# Patient Record
Sex: FEMALE | Race: WHITE | Employment: OTHER | URBAN - METROPOLITAN AREA
[De-identification: names, ages, dates, MRNs, and addresses within clinical notes are randomized per-mention and may not be internally consistent; named-entity substitution may affect disease eponyms.]

---

## 2017-08-24 ENCOUNTER — HOSPITAL ENCOUNTER (INPATIENT)
Facility: HOSPITAL | Age: 82
LOS: 2 days | Discharge: HOME HEALTH | DRG: 309 | End: 2017-08-26
Attending: EMERGENCY MEDICINE | Admitting: HOSPITALIST
Payer: MEDICARE

## 2017-08-24 ENCOUNTER — APPOINTMENT (OUTPATIENT)
Dept: GENERAL RADIOLOGY | Facility: HOSPITAL | Age: 82
DRG: 309 | End: 2017-08-24
Attending: EMERGENCY MEDICINE
Payer: MEDICARE

## 2017-08-24 ENCOUNTER — OFFICE VISIT (OUTPATIENT)
Dept: INTERNAL MEDICINE CLINIC | Facility: CLINIC | Age: 82
End: 2017-08-24

## 2017-08-24 VITALS
WEIGHT: 131 LBS | RESPIRATION RATE: 14 BRPM | BODY MASS INDEX: 21.83 KG/M2 | OXYGEN SATURATION: 98 % | HEIGHT: 65 IN | HEART RATE: 80 BPM | TEMPERATURE: 98 F | DIASTOLIC BLOOD PRESSURE: 110 MMHG | SYSTOLIC BLOOD PRESSURE: 180 MMHG

## 2017-08-24 DIAGNOSIS — I48.91 ATRIAL FIBRILLATION WITH RAPID VENTRICULAR RESPONSE (HCC): Primary | ICD-10-CM

## 2017-08-24 DIAGNOSIS — R20.2 PINS AND NEEDLES SENSATION: ICD-10-CM

## 2017-08-24 DIAGNOSIS — L98.9 SKIN ABNORMALITY: ICD-10-CM

## 2017-08-24 DIAGNOSIS — I10 ESSENTIAL HYPERTENSION: Primary | ICD-10-CM

## 2017-08-24 DIAGNOSIS — I48.91 ATRIAL FIBRILLATION WITH RAPID VENTRICULAR RESPONSE (HCC): ICD-10-CM

## 2017-08-24 DIAGNOSIS — T14.8XXA WOUND OF SKIN: ICD-10-CM

## 2017-08-24 LAB
ANION GAP SERPL CALC-SCNC: 9 MMOL/L (ref 0–18)
APTT PPP: 29.4 SECONDS (ref 23.2–35.3)
BASOPHILS # BLD: 0.1 K/UL (ref 0–0.2)
BASOPHILS NFR BLD: 1 %
BUN SERPL-MCNC: 16 MG/DL (ref 8–20)
BUN/CREAT SERPL: 18.6 (ref 10–20)
CALCIUM SERPL-MCNC: 9.3 MG/DL (ref 8.5–10.5)
CHLORIDE SERPL-SCNC: 99 MMOL/L (ref 95–110)
CO2 SERPL-SCNC: 27 MMOL/L (ref 22–32)
CREAT SERPL-MCNC: 0.86 MG/DL (ref 0.5–1.5)
EOSINOPHIL # BLD: 0.1 K/UL (ref 0–0.7)
EOSINOPHIL NFR BLD: 1 %
ERYTHROCYTE [DISTWIDTH] IN BLOOD BY AUTOMATED COUNT: 14.2 % (ref 11–15)
GLUCOSE SERPL-MCNC: 128 MG/DL (ref 70–99)
HCT VFR BLD AUTO: 40.8 % (ref 35–48)
HGB BLD-MCNC: 13.3 G/DL (ref 12–16)
INR BLD: 1 (ref 0.9–1.2)
LYMPHOCYTES # BLD: 1 K/UL (ref 1–4)
LYMPHOCYTES NFR BLD: 14 %
MCH RBC QN AUTO: 30.4 PG (ref 27–32)
MCHC RBC AUTO-ENTMCNC: 32.7 G/DL (ref 32–37)
MCV RBC AUTO: 92.9 FL (ref 80–100)
MONOCYTES # BLD: 0.6 K/UL (ref 0–1)
MONOCYTES NFR BLD: 8 %
NEUTROPHILS # BLD AUTO: 5.7 K/UL (ref 1.8–7.7)
NEUTROPHILS NFR BLD: 76 %
OSMOLALITY UR CALC.SUM OF ELEC: 283 MOSM/KG (ref 275–295)
PLATELET # BLD AUTO: 234 K/UL (ref 140–400)
PMV BLD AUTO: 8.3 FL (ref 7.4–10.3)
POTASSIUM SERPL-SCNC: 4.8 MMOL/L (ref 3.3–5.1)
PROTHROMBIN TIME: 12.9 SECONDS (ref 11.8–14.5)
RBC # BLD AUTO: 4.39 M/UL (ref 3.7–5.4)
SODIUM SERPL-SCNC: 135 MMOL/L (ref 136–144)
TROPONIN I SERPL-MCNC: 0.01 NG/ML (ref ?–0.03)
WBC # BLD AUTO: 7.5 K/UL (ref 4–11)

## 2017-08-24 PROCEDURE — G0463 HOSPITAL OUTPT CLINIC VISIT: HCPCS | Performed by: INTERNAL MEDICINE

## 2017-08-24 PROCEDURE — 93010 ELECTROCARDIOGRAM REPORT: CPT | Performed by: INTERNAL MEDICINE

## 2017-08-24 PROCEDURE — 99215 OFFICE O/P EST HI 40 MIN: CPT | Performed by: INTERNAL MEDICINE

## 2017-08-24 PROCEDURE — 71010 XR CHEST AP PORTABLE  (CPT=71010): CPT | Performed by: EMERGENCY MEDICINE

## 2017-08-24 PROCEDURE — 99222 1ST HOSP IP/OBS MODERATE 55: CPT | Performed by: HOSPITALIST

## 2017-08-24 PROCEDURE — 93005 ELECTROCARDIOGRAM TRACING: CPT | Performed by: INTERNAL MEDICINE

## 2017-08-24 RX ORDER — ONDANSETRON 2 MG/ML
4 INJECTION INTRAMUSCULAR; INTRAVENOUS EVERY 6 HOURS PRN
Status: DISCONTINUED | OUTPATIENT
Start: 2017-08-24 | End: 2017-08-26

## 2017-08-24 RX ORDER — ACETAMINOPHEN 325 MG/1
650 TABLET ORAL EVERY 6 HOURS PRN
Status: DISCONTINUED | OUTPATIENT
Start: 2017-08-24 | End: 2017-08-26

## 2017-08-24 RX ORDER — HEPARIN SODIUM 5000 [USP'U]/ML
5000 INJECTION, SOLUTION INTRAVENOUS; SUBCUTANEOUS EVERY 12 HOURS SCHEDULED
Status: DISCONTINUED | OUTPATIENT
Start: 2017-08-24 | End: 2017-08-26

## 2017-08-24 RX ORDER — SODIUM CHLORIDE 0.9 % (FLUSH) 0.9 %
3 SYRINGE (ML) INJECTION AS NEEDED
Status: DISCONTINUED | OUTPATIENT
Start: 2017-08-24 | End: 2017-08-26

## 2017-08-24 RX ORDER — DILTIAZEM HYDROCHLORIDE 5 MG/ML
10 INJECTION INTRAVENOUS
Status: DISPENSED | OUTPATIENT
Start: 2017-08-24 | End: 2017-08-24

## 2017-08-24 RX ORDER — NITROGLYCERIN 0.4 MG/1
0.4 TABLET SUBLINGUAL EVERY 5 MIN PRN
Status: DISCONTINUED | OUTPATIENT
Start: 2017-08-24 | End: 2017-08-26

## 2017-08-24 RX ORDER — DILTIAZEM HYDROCHLORIDE 60 MG/1
60 TABLET, FILM COATED ORAL AS NEEDED
Status: DISCONTINUED | OUTPATIENT
Start: 2017-08-24 | End: 2017-08-26

## 2017-08-24 NOTE — PROGRESS NOTES
HPI:   Romayne Ponder is a 80year old female presents with the following problems. Patient presents after having not been seen since October 27, 2014.   At that time I was treating her for hypertension, hyperlipidemia, osteoporosis and history of skin can 04/03/2012    (as per NG)   • Osteoarthrosis, unspecified whether generalized or localized, unspecified site     (as per NG)   • Osteoporosis screening 02/23/2009    Dexascan (as per NG)   • Other and unspecified hyperlipidemia     (as per NG)   • Unspecif mOsm/kg   AST 28 15 - 41 U/L   ALT 14 14 - 54 U/L   Alkaline Phosphatase 89 32 - 100 U/L   Bilirubin, Total 1.1 0.3 - 1.2 mg/dL   TOTAL PROTEIN (P) 7.5 5.9 - 8.4 g/dL   ALBUMIN (P) 3.9 3.5 - 4.8 g/dL   GLOBULIN 3.6 2.5 - 3.7 g/dL   A/G RATIO 1.1 1.0 - 2.0 98.3 °F (36.8 °C) (Oral)   Resp 14   Ht 5' 5\" (1.651 m)   Wt 131 lb (59.4 kg)   SpO2 98%   BMI 21.80 kg/m²        GENERAL:  well developed, well nourished. in no apparent distress  SKIN: 0.5 cm skin lesion right cheek suspicious for skin cancer.   See abo fibrillation. She is increased stroke risk. Decision on antihypertensive therapy. Wound care. Patient agreeable. She is here with her daughter Alexey Amin. She is stable enough for transfer asked to go to the emergency room.         This was a high complexi

## 2017-08-25 ENCOUNTER — APPOINTMENT (OUTPATIENT)
Dept: CV DIAGNOSTICS | Facility: HOSPITAL | Age: 82
DRG: 309 | End: 2017-08-25
Attending: HOSPITALIST
Payer: MEDICARE

## 2017-08-25 LAB
ANION GAP SERPL CALC-SCNC: 5 MMOL/L (ref 0–18)
BASOPHILS # BLD: 0.1 K/UL (ref 0–0.2)
BASOPHILS NFR BLD: 1 %
BUN SERPL-MCNC: 15 MG/DL (ref 8–20)
BUN/CREAT SERPL: 20 (ref 10–20)
CALCIUM SERPL-MCNC: 8.5 MG/DL (ref 8.5–10.5)
CHLORIDE SERPL-SCNC: 104 MMOL/L (ref 95–110)
CO2 SERPL-SCNC: 30 MMOL/L (ref 22–32)
CREAT SERPL-MCNC: 0.75 MG/DL (ref 0.5–1.5)
EOSINOPHIL # BLD: 0.2 K/UL (ref 0–0.7)
EOSINOPHIL NFR BLD: 4 %
ERYTHROCYTE [DISTWIDTH] IN BLOOD BY AUTOMATED COUNT: 14.5 % (ref 11–15)
GLUCOSE SERPL-MCNC: 83 MG/DL (ref 70–99)
HCT VFR BLD AUTO: 35.3 % (ref 35–48)
HGB BLD-MCNC: 11.7 G/DL (ref 12–16)
LYMPHOCYTES # BLD: 0.8 K/UL (ref 1–4)
LYMPHOCYTES NFR BLD: 15 %
MCH RBC QN AUTO: 30.7 PG (ref 27–32)
MCHC RBC AUTO-ENTMCNC: 33.2 G/DL (ref 32–37)
MCV RBC AUTO: 92.6 FL (ref 80–100)
MONOCYTES # BLD: 0.5 K/UL (ref 0–1)
MONOCYTES NFR BLD: 9 %
NEUTROPHILS # BLD AUTO: 3.8 K/UL (ref 1.8–7.7)
NEUTROPHILS NFR BLD: 71 %
OSMOLALITY UR CALC.SUM OF ELEC: 288 MOSM/KG (ref 275–295)
PLATELET # BLD AUTO: 204 K/UL (ref 140–400)
PMV BLD AUTO: 7.8 FL (ref 7.4–10.3)
POTASSIUM SERPL-SCNC: 4.3 MMOL/L (ref 3.3–5.1)
RBC # BLD AUTO: 3.81 M/UL (ref 3.7–5.4)
SODIUM SERPL-SCNC: 139 MMOL/L (ref 136–144)
WBC # BLD AUTO: 5.3 K/UL (ref 4–11)

## 2017-08-25 PROCEDURE — 99232 SBSQ HOSP IP/OBS MODERATE 35: CPT | Performed by: HOSPITALIST

## 2017-08-25 PROCEDURE — 93306 TTE W/DOPPLER COMPLETE: CPT | Performed by: HOSPITALIST

## 2017-08-25 NOTE — PROGRESS NOTES
[facility] HOSP - [facility]    Progress Note    Tushar Sanchez Patient Status:  Inpatient    1922 MRN [id number]   Location [facility] 3W/SW Attending Grace Ardon MD   Hosp Day # 1 PCP Yenny Mayfield MD       Subjective:   Tushar Sanchez i DilTIAZem HCl (CARDIZEM) tab 60 mg, 60 mg, Oral, PRN  •  metoprolol Tartrate (LOPRESSOR) tab 25 mg, 25 mg, Oral, BID    Assessment and Plan:     Atrial fibrillation with rapid ventricular response (HCC)  Rate now controlled, rhythm afib, bp stable  Cont BB

## 2017-08-25 NOTE — ED NOTES
Patient is safe to transport to floor per MD. Report given to floor CV  RN, Avinash Bucio. Transport via cart will be requested upon arrival of cardiac telemetry box.

## 2017-08-25 NOTE — ED PROVIDER NOTES
Patient Seen in: [facility] 3w/sw    History   Patient presents with:  Abnormal Result (metabolic, cardiac)    Stated Complaint: abnormal ekg    HPI    Patient complains of elevated hr unsure of onset. Yonas Beverly went to routine office visit and noted to h Alcohol use: Yes              Comment: Occasionally, Wine, 1 glass (as per NG)      Review of Systems    Positive for stated complaint: abnormal ekg  Other systems are as noted in HPI. Constitutional and vital signs reviewed.       All other -----------         ------                     CBC W/ DIFFERENTIAL[[id number]]          Normal              Final result                 Please view results for these tests on the individual orders.    BASIC METABOLIC PANEL (8)   CBC WITH DIFFER

## 2017-08-25 NOTE — PROGRESS NOTES
[facility]    Progress Note    Iliana Zaman Patient Status:  Inpatient    1922 MRN [id number]   Location [facility] 3W/SW Attending Mason Tam MD   Hosp Day # 1 PCP Priyanka Acevedo MD         Assessment and Plan:     A ALB 3.9 10/27/2014   BILT 1.1 10/27/2014   TP 7.5 10/27/2014   AST 28 10/27/2014   ALT 14 10/27/2014   PTT 29.4 08/24/2017   INR 1.0 08/24/2017   T4F 0.87 12/17/2013   TSH 4.44 12/17/2013   TROP 0.01 08/24/2017       Xr Chest Ap Portable  (cpt=71010)

## 2017-08-25 NOTE — WOUND PROGRESS NOTE
WOUND CARE NOTE      PLAN   Recommendations:  Heels elevated using pillows, heel wedge or heel boots to offload heels  Glucose control to help promote wound healing  Moisturize skin daily and prn    Wound(s)  Location: R posterior malleolus  Cleansing  W

## 2017-08-25 NOTE — CM/SW NOTE
Explanation of of BPCI/Medicare program provided. Patient was enrolled under . Patient/family agreed to phone f/u for 3 months from [address] [facility] after discharge from [address] [place] [address]. BPCI/Medicare letter and brochure provided.     From home with daughter

## 2017-08-25 NOTE — H&P
[facility]    PATIENT'S NAME: Dragan Chowdhury PHYSICIAN: Gabriela Hicks MD   PATIENT ACCOUNT#:   [id number]    LOCATION:  [place] [address]  MEDICAL RECORD #:   [id number]       YOB: 1922  ADMISSION DATE:       08/24/2017 PHYSICAL EXAMINATION:    GENERAL:  Alert and oriented to time, place and person, no acute distress. VITAL SIGNS:  Temperature 98.8, pulse 78, respiratory rate 18, blood pressure 145/55, pulse ox 98% on room air. HEENT:  Atraumatic. Oropharynx clear.

## 2017-08-25 NOTE — CONSULTS
[facility]  Report of Consultation    Godfreymarnaun Peñaysabel Patient Status:  Inpatient    1922 MRN [id number]   Location [facility] 3W/SW Attending Misa Hinkle MD   Hosp Day # 0 PCP Kamla Pepper MD     Reason for Consultation:  Afib drugs.     Allergies:  No Known Allergies    Medications:    Current Facility-Administered Medications:   •  DilTIAZem HCl (CARDIZEM) injection 10 mg, 10 mg, Intravenous, Q1H PRN  •  Normal Saline Flush 0.9 % injection 3 mL, 3 mL, Intravenous, PRN  •  Atrop beta blockers   -ECHO   -TSH   -discussion regarding anticoagulation with a CHADs score of 2  2. Anticoagulation - though 89 she looks much younger; discuss with primary MD regarding ?anticougulation  3.  HTN - not treated due to non compliance; will likely

## 2017-08-26 VITALS
SYSTOLIC BLOOD PRESSURE: 124 MMHG | HEART RATE: 86 BPM | WEIGHT: 127.31 LBS | DIASTOLIC BLOOD PRESSURE: 81 MMHG | TEMPERATURE: 98 F | BODY MASS INDEX: 21.21 KG/M2 | HEIGHT: 65 IN | OXYGEN SATURATION: 95 % | RESPIRATION RATE: 16 BRPM

## 2017-08-26 LAB
ANION GAP SERPL CALC-SCNC: 5 MMOL/L (ref 0–18)
BASOPHILS # BLD: 0.1 K/UL (ref 0–0.2)
BASOPHILS NFR BLD: 1 %
BUN SERPL-MCNC: 18 MG/DL (ref 8–20)
BUN/CREAT SERPL: 21.2 (ref 10–20)
CALCIUM SERPL-MCNC: 8.6 MG/DL (ref 8.5–10.5)
CHLORIDE SERPL-SCNC: 103 MMOL/L (ref 95–110)
CO2 SERPL-SCNC: 29 MMOL/L (ref 22–32)
CREAT SERPL-MCNC: 0.85 MG/DL (ref 0.5–1.5)
EOSINOPHIL # BLD: 0.3 K/UL (ref 0–0.7)
EOSINOPHIL NFR BLD: 5 %
ERYTHROCYTE [DISTWIDTH] IN BLOOD BY AUTOMATED COUNT: 14.6 % (ref 11–15)
GLUCOSE SERPL-MCNC: 84 MG/DL (ref 70–99)
HCT VFR BLD AUTO: 36.6 % (ref 35–48)
HGB BLD-MCNC: 12.1 G/DL (ref 12–16)
LYMPHOCYTES # BLD: 1.1 K/UL (ref 1–4)
LYMPHOCYTES NFR BLD: 20 %
MCH RBC QN AUTO: 30.7 PG (ref 27–32)
MCHC RBC AUTO-ENTMCNC: 33.1 G/DL (ref 32–37)
MCV RBC AUTO: 92.5 FL (ref 80–100)
MONOCYTES # BLD: 0.5 K/UL (ref 0–1)
MONOCYTES NFR BLD: 9 %
NEUTROPHILS # BLD AUTO: 3.7 K/UL (ref 1.8–7.7)
NEUTROPHILS NFR BLD: 65 %
OSMOLALITY UR CALC.SUM OF ELEC: 285 MOSM/KG (ref 275–295)
PLATELET # BLD AUTO: 208 K/UL (ref 140–400)
PMV BLD AUTO: 8.3 FL (ref 7.4–10.3)
POTASSIUM SERPL-SCNC: 4.2 MMOL/L (ref 3.3–5.1)
RBC # BLD AUTO: 3.95 M/UL (ref 3.7–5.4)
SODIUM SERPL-SCNC: 137 MMOL/L (ref 136–144)
WBC # BLD AUTO: 5.7 K/UL (ref 4–11)

## 2017-08-26 PROCEDURE — 99239 HOSP IP/OBS DSCHRG MGMT >30: CPT | Performed by: HOSPITALIST

## 2017-08-26 NOTE — PLAN OF CARE
Home health to follow , rx faxed to pharmacy, instructions given, no further question, bleeding precautions reviewed

## 2017-08-26 NOTE — PROGRESS NOTES
[facility]    Cardiology Progress Note  [facility]    Rachel Calderon Patient Status:  Inpatient    1922 MRN [id number]   Location [facility] 3W/SW Attending Mac Ness MD   Hosp Day # 2 PCP Ariana Garcia density at the right lower chest which represents a sclerotic bone lesion at anterior aspect of the right sixth rib on previous CT imaging. Can't exclude enlarging bone lesion, and a followup CT the chest is advised to further evaluate.  No acute infiltrate

## 2017-08-26 NOTE — DISCHARGE SUMMARY
[facility]    Discharge Summary    Quyen Huertas Patient Status:  Inpatient    1922 MRN [id number]   Location [facility] 3W/SW Attending Naren Bauman MD   Hosp Day # 2 PCP Tra Quinteros MD     Date of Admission:  was no edema, R foot dressing CDI  NEUROLOGIC: no focal defecits    History of Present Illness:   Per Dr. Jimmy Berman  The patient is a 68-year-old  female who went to see her primary care physician, Dr. Travon Winston, today because of an abrasion and wound Take 1 tablet by mouth daily. take 2 by Oral route  every day   Refills:  0     Vitamin D3 2000 units Caps  Commonly known as:  VITAMIN D3      Take  by mouth.  take 1 by po route  every day   Refills:  0           Where to Get Your Medications      Sadea

## 2017-08-26 NOTE — HOME CARE LIAISON
DIAGNOSES AND PERTINENT MEDICAL HISTORY: AFIB, WOUND TO R POSTERIOR MALLEOLUS    FACILITY NAME AND DC DATE: [address] [place] [address] 8/26    BEDSIDE VISIT WITH: PTNT AND DAUGHTER    SERVICES ORDERED: RN     VERIFIED PATIENT ADDRESS, PHONE NUMBER AND CAREGIVER: YES    PCP IS

## 2017-08-28 ENCOUNTER — TELEPHONE (OUTPATIENT)
Dept: INTERNAL MEDICINE CLINIC | Facility: CLINIC | Age: 82
End: 2017-08-28

## 2017-08-28 ENCOUNTER — PATIENT OUTREACH (OUTPATIENT)
Dept: INTERNAL MEDICINE CLINIC | Facility: CLINIC | Age: 82
End: 2017-08-28

## 2017-08-28 RX ORDER — ACETAMINOPHEN 325 MG/1
325 TABLET ORAL EVERY 6 HOURS PRN
COMMUNITY
End: 2020-01-15 | Stop reason: ALTCHOICE

## 2017-08-28 NOTE — TELEPHONE ENCOUNTER
[phone]  Admitted pt to [facility] today and is it ok to ck pt O2 at each visit  Also add PT to evaluate patient  To clinical

## 2017-08-28 NOTE — PROGRESS NOTES
Initial Post Discharge Follow Up   Discharge Date: 8/26/17  Contact Date: 8/28/2017    Consent Verification:  Assessment Completed With: Patient  HIPAA Verified? Yes    1.  Tell me why you were in the hospital?  Patient was seen by PCP in the office for a appointments     Date & Time Appointment Department [facility])    Sep 05, 2017  8:00 AM CDT [address] [facility] Jerardo with [facility]  [facility] [address] ([address])    Sep 05, 2017 10:30 AM CDT [facility]

## 2017-08-28 NOTE — TELEPHONE ENCOUNTER
Fernandez Acevedo from [facility] and relayed DR. ELA lock and she verbalized understanding

## 2017-08-28 NOTE — TELEPHONE ENCOUNTER
Yes.  Absolutely. Check O2 saturation each visit along with blood pressure and pulse. Also physical therapy and maybe occupational therapy to evaluate. In addition may be social service to evaluate.

## 2017-08-29 ENCOUNTER — TELEPHONE (OUTPATIENT)
Dept: CARDIOLOGY UNIT | Facility: HOSPITAL | Age: 82
End: 2017-08-29

## 2017-08-30 ENCOUNTER — TELEPHONE (OUTPATIENT)
Dept: INTERNAL MEDICINE CLINIC | Facility: CLINIC | Age: 82
End: 2017-08-30

## 2017-08-30 NOTE — TELEPHONE ENCOUNTER
Please advise - called [address] [place] [address] who wants to know if they can change from wound cleanser to normal saline ( cleanser is bothering patient ) to DR. MAHMOOD

## 2017-08-30 NOTE — TELEPHONE ENCOUNTER
Called  from [facility] and relayed DR. MAHMOOD message and she verbalized understanding

## 2017-08-30 NOTE — TELEPHONE ENCOUNTER
Please call Ninoska Faye @ [facility] re: patient's wound care orders [phone]

## 2017-09-01 ENCOUNTER — TELEPHONE (OUTPATIENT)
Dept: INTERNAL MEDICINE CLINIC | Facility: CLINIC | Age: 82
End: 2017-09-01

## 2017-09-04 NOTE — PROGRESS NOTES
[facility] Patient Status:  Outpatient    1922 MRN [id number]   Location MD Hamlet Santos MD       Cherri Gaona is a 80year old female who presents to clinic for hosp 09/05/2017 07:58 AM    (H) 09/05/2017 07:58 AM   CA 9.1 09/05/2017 07:58 AM   ALB 3.4 (L) 09/05/2017 07:58 AM   ALKPHO 98 09/05/2017 07:58 AM   BILT 0.6 09/05/2017 07:58 AM   TP 6.6 09/05/2017 07:58 AM   AST 51 (H) 09/05/2017 07:58 AM   ALT 22 09/05 and untreated HTN. Was also treated for right heal wound. Patient had stopped taking her medications for the last couple years. Rate controlled with metoprolol tartrate and anticoagulated on Eliquis 2.5 mg twice daily, [id number] score of 3.    History of blood pressure in with you to next clinic visit.     Omron blood pressure monitor is a quality monitor        Additional recommendations:     · Compare your home medications with the medication list attached, call with questions or there are any differences

## 2017-09-05 ENCOUNTER — TELEPHONE (OUTPATIENT)
Dept: INTERNAL MEDICINE CLINIC | Facility: CLINIC | Age: 82
End: 2017-09-05

## 2017-09-05 ENCOUNTER — OFFICE VISIT (OUTPATIENT)
Dept: CARDIOLOGY CLINIC | Facility: HOSPITAL | Age: 82
End: 2017-09-05
Attending: INTERNAL MEDICINE
Payer: MEDICARE

## 2017-09-05 ENCOUNTER — OFFICE VISIT (OUTPATIENT)
Dept: INTERNAL MEDICINE CLINIC | Facility: CLINIC | Age: 82
End: 2017-09-05

## 2017-09-05 VITALS
BODY MASS INDEX: 22 KG/M2 | DIASTOLIC BLOOD PRESSURE: 83 MMHG | OXYGEN SATURATION: 97 % | SYSTOLIC BLOOD PRESSURE: 165 MMHG | WEIGHT: 133 LBS | HEART RATE: 92 BPM

## 2017-09-05 VITALS
SYSTOLIC BLOOD PRESSURE: 164 MMHG | OXYGEN SATURATION: 97 % | HEIGHT: 65 IN | DIASTOLIC BLOOD PRESSURE: 100 MMHG | WEIGHT: 133.38 LBS | HEART RATE: 92 BPM | BODY MASS INDEX: 22.22 KG/M2 | TEMPERATURE: 98 F

## 2017-09-05 DIAGNOSIS — I10 ESSENTIAL HYPERTENSION: Primary | ICD-10-CM

## 2017-09-05 DIAGNOSIS — I48.19 PERSISTENT ATRIAL FIBRILLATION (HCC): ICD-10-CM

## 2017-09-05 DIAGNOSIS — L98.9 SKIN LESION OF CHEEK: ICD-10-CM

## 2017-09-05 DIAGNOSIS — I49.5 SICK SINUS SYNDROME (HCC): ICD-10-CM

## 2017-09-05 DIAGNOSIS — I48.91 ATRIAL FIBRILLATION WITH RAPID VENTRICULAR RESPONSE (HCC): ICD-10-CM

## 2017-09-05 DIAGNOSIS — I10 ESSENTIAL HYPERTENSION: ICD-10-CM

## 2017-09-05 DIAGNOSIS — I48.91 ATRIAL FIBRILLATION, UNSPECIFIED TYPE (HCC): ICD-10-CM

## 2017-09-05 DIAGNOSIS — E78.5 HYPERLIPIDEMIA, UNSPECIFIED HYPERLIPIDEMIA TYPE: ICD-10-CM

## 2017-09-05 DIAGNOSIS — I48.91 ATRIAL FIBRILLATION (HCC): Primary | ICD-10-CM

## 2017-09-05 DIAGNOSIS — R20.2 PINS AND NEEDLES SENSATION: ICD-10-CM

## 2017-09-05 DIAGNOSIS — S81.801D WOUND OF RIGHT LOWER EXTREMITY, SUBSEQUENT ENCOUNTER: ICD-10-CM

## 2017-09-05 PROBLEM — R93.89 ABNORMAL CXR: Status: ACTIVE | Noted: 2017-09-05

## 2017-09-05 LAB
ALBUMIN SERPL BCP-MCNC: 3.4 G/DL (ref 3.5–4.8)
ALBUMIN/GLOB SERPL: 1.1 {RATIO} (ref 1–2)
ALP SERPL-CCNC: 98 U/L (ref 32–100)
ALT SERPL-CCNC: 22 U/L (ref 14–54)
ANION GAP SERPL CALC-SCNC: 8 MMOL/L (ref 0–18)
AST SERPL-CCNC: 51 U/L (ref 15–41)
BILIRUB SERPL-MCNC: 0.6 MG/DL (ref 0.3–1.2)
BUN SERPL-MCNC: 13 MG/DL (ref 8–20)
BUN/CREAT SERPL: 15.1 (ref 10–20)
CALCIUM SERPL-MCNC: 9.1 MG/DL (ref 8.5–10.5)
CHLORIDE SERPL-SCNC: 102 MMOL/L (ref 95–110)
CO2 SERPL-SCNC: 28 MMOL/L (ref 22–32)
CREAT SERPL-MCNC: 0.86 MG/DL (ref 0.5–1.5)
GLOBULIN PLAS-MCNC: 3.2 G/DL (ref 2.5–3.7)
GLUCOSE SERPL-MCNC: 129 MG/DL (ref 70–99)
MAGNESIUM SERPL-MCNC: 2.1 MG/DL (ref 1.8–2.5)
OSMOLALITY UR CALC.SUM OF ELEC: 288 MOSM/KG (ref 275–295)
POTASSIUM SERPL-SCNC: 4.8 MMOL/L (ref 3.3–5.1)
PROT SERPL-MCNC: 6.6 G/DL (ref 5.9–8.4)
SODIUM SERPL-SCNC: 138 MMOL/L (ref 136–144)
TSH SERPL-ACNC: 6.74 UIU/ML (ref 0.45–5.33)

## 2017-09-05 PROCEDURE — 99214 OFFICE O/P EST MOD 30 MIN: CPT | Performed by: INTERNAL MEDICINE

## 2017-09-05 PROCEDURE — 36415 COLL VENOUS BLD VENIPUNCTURE: CPT | Performed by: NURSE PRACTITIONER

## 2017-09-05 PROCEDURE — 84443 ASSAY THYROID STIM HORMONE: CPT | Performed by: NURSE PRACTITIONER

## 2017-09-05 PROCEDURE — 93010 ELECTROCARDIOGRAM REPORT: CPT | Performed by: CLINICAL NURSE SPECIALIST

## 2017-09-05 PROCEDURE — 93005 ELECTROCARDIOGRAM TRACING: CPT

## 2017-09-05 PROCEDURE — 99212 OFFICE O/P EST SF 10 MIN: CPT | Performed by: NURSE PRACTITIONER

## 2017-09-05 PROCEDURE — 99214 OFFICE O/P EST MOD 30 MIN: CPT | Performed by: NURSE PRACTITIONER

## 2017-09-05 PROCEDURE — 83735 ASSAY OF MAGNESIUM: CPT | Performed by: NURSE PRACTITIONER

## 2017-09-05 PROCEDURE — G0463 HOSPITAL OUTPT CLINIC VISIT: HCPCS | Performed by: INTERNAL MEDICINE

## 2017-09-05 PROCEDURE — 80053 COMPREHEN METABOLIC PANEL: CPT | Performed by: NURSE PRACTITIONER

## 2017-09-05 RX ORDER — LISINOPRIL 5 MG/1
5 TABLET ORAL DAILY
Qty: 30 TABLET | Refills: 1 | Status: SHIPPED | OUTPATIENT
Start: 2017-09-05 | End: 2017-09-12

## 2017-09-05 NOTE — PATIENT INSTRUCTIONS
Begin taking lisinopril 5 mg tabs, one tablet daily if ok with Dr. Gio Coffman all your other same medications    Call if having any dizziness, lightheadedness, heart racing, palpitations, chest pain, shortness of breath, coughing, swelling or  weig

## 2017-09-05 NOTE — PROGRESS NOTES
HPI:   Ashley Sheikh is a 80year old female presents with the following problems. Patient was discharged from the hospital August 26. I would reach to her August 28. Today September 5.     Patient was in the hospital with atrial fibrillation with rapid mass index is 22.2 kg/m². Current Outpatient Prescriptions:  Calcium Carbonate-Vit D-Min (CALCIUM 600 + MINERALS OR) Take 1 tablet by mouth daily. Disp:  Rfl:    lisinopril 5 MG Oral Tab Take 1 tablet (5 mg total) by mouth daily.  Disp: 30 tablet Rfl: 144 mmol/L   Potassium 4.8 3.3 - 5.1 mmol/L   Chloride 102 95 - 110 mmol/L   CO2 28 22 - 32 mmol/L   BUN 13 8 - 20 mg/dL   Creatinine 0.86 0.50 - 1.50 mg/dL   Calcium, Total 9.1 8.5 - 10.5 mg/dL   ALT 22 14 - 54 U/L   AST 51 (H) 15 - 41 U/L   Alkaline Phos pharynx without exudate or erythema  EYES:  PERRL . conjunctiva clear. NECK:  supple, no adenopathy,   LUNGS:  clear to auscultation. Effort normal  CARDIO: Irregularly irregular rate and rhythm system with defibrillation without murmur.    S1 and S2 pamela and needles sensation  She has pins and needles in her hands bilaterally. Also some forearm. No neck pain. I think this is carpal tunnel syndrome. It goes away later in the day. For now we will monitor.     4. Skin lesion of cheek  Referred to dermatol

## 2017-09-06 ENCOUNTER — TELEPHONE (OUTPATIENT)
Dept: INTERNAL MEDICINE CLINIC | Facility: CLINIC | Age: 82
End: 2017-09-06

## 2017-09-06 NOTE — TELEPHONE ENCOUNTER
Edson Martin from [facility] and relayed DR. MAHMOOD message - verbalized understanding

## 2017-09-06 NOTE — TELEPHONE ENCOUNTER
Would like to stop Vaseline guaze to her right posterior lower leg  And use alginate & cover with foam     Please call Michelle Escalante with Dr Sandra Farias ok - Sindi Duty Dr Chaim Castleman is out of the office today

## 2017-09-07 ENCOUNTER — TELEPHONE (OUTPATIENT)
Dept: INTERNAL MEDICINE CLINIC | Facility: CLINIC | Age: 82
End: 2017-09-07

## 2017-09-07 NOTE — TELEPHONE ENCOUNTER
Mariama Greene needs a verbal order to R/S physical therapy from 8/31 to today.      Tasked high to Nursing

## 2017-09-11 ENCOUNTER — TELEPHONE (OUTPATIENT)
Dept: INTERNAL MEDICINE CLINIC | Facility: CLINIC | Age: 82
End: 2017-09-11

## 2017-09-12 ENCOUNTER — OFFICE VISIT (OUTPATIENT)
Dept: INTERNAL MEDICINE CLINIC | Facility: CLINIC | Age: 82
End: 2017-09-12

## 2017-09-12 ENCOUNTER — APPOINTMENT (OUTPATIENT)
Dept: LAB | Age: 82
End: 2017-09-12
Attending: INTERNAL MEDICINE
Payer: MEDICARE

## 2017-09-12 ENCOUNTER — TELEPHONE (OUTPATIENT)
Dept: INTERNAL MEDICINE CLINIC | Facility: CLINIC | Age: 82
End: 2017-09-12

## 2017-09-12 VITALS
WEIGHT: 132.19 LBS | DIASTOLIC BLOOD PRESSURE: 96 MMHG | BODY MASS INDEX: 22.02 KG/M2 | SYSTOLIC BLOOD PRESSURE: 160 MMHG | OXYGEN SATURATION: 97 % | HEIGHT: 65 IN | HEART RATE: 111 BPM | TEMPERATURE: 98 F

## 2017-09-12 DIAGNOSIS — I10 ESSENTIAL HYPERTENSION: ICD-10-CM

## 2017-09-12 DIAGNOSIS — R93.89 ABNORMAL CXR: ICD-10-CM

## 2017-09-12 DIAGNOSIS — I48.91 ATRIAL FIBRILLATION, UNSPECIFIED TYPE (HCC): ICD-10-CM

## 2017-09-12 DIAGNOSIS — R91.8 ABNORMALITY OF LUNG ON CXR: Primary | ICD-10-CM

## 2017-09-12 DIAGNOSIS — L98.9 SKIN LESION OF CHEEK: ICD-10-CM

## 2017-09-12 DIAGNOSIS — S81.801D WOUND OF RIGHT LOWER EXTREMITY, SUBSEQUENT ENCOUNTER: ICD-10-CM

## 2017-09-12 DIAGNOSIS — I10 ESSENTIAL HYPERTENSION: Primary | ICD-10-CM

## 2017-09-12 LAB
ANION GAP SERPL CALC-SCNC: 9 MMOL/L (ref 0–18)
BUN SERPL-MCNC: 14 MG/DL (ref 8–20)
BUN/CREAT SERPL: 15.6 (ref 10–20)
CALCIUM SERPL-MCNC: 9.4 MG/DL (ref 8.5–10.5)
CHLORIDE SERPL-SCNC: 100 MMOL/L (ref 95–110)
CO2 SERPL-SCNC: 29 MMOL/L (ref 22–32)
CREAT SERPL-MCNC: 0.9 MG/DL (ref 0.5–1.5)
GLUCOSE SERPL-MCNC: 92 MG/DL (ref 70–99)
OSMOLALITY UR CALC.SUM OF ELEC: 286 MOSM/KG (ref 275–295)
POTASSIUM SERPL-SCNC: 5.1 MMOL/L (ref 3.3–5.1)
SODIUM SERPL-SCNC: 138 MMOL/L (ref 136–144)
TSH SERPL-ACNC: 5.03 UIU/ML (ref 0.45–5.33)

## 2017-09-12 PROCEDURE — 36415 COLL VENOUS BLD VENIPUNCTURE: CPT

## 2017-09-12 PROCEDURE — G0463 HOSPITAL OUTPT CLINIC VISIT: HCPCS | Performed by: INTERNAL MEDICINE

## 2017-09-12 PROCEDURE — 84443 ASSAY THYROID STIM HORMONE: CPT

## 2017-09-12 PROCEDURE — 99214 OFFICE O/P EST MOD 30 MIN: CPT | Performed by: INTERNAL MEDICINE

## 2017-09-12 PROCEDURE — 80048 BASIC METABOLIC PNL TOTAL CA: CPT

## 2017-09-12 RX ORDER — LISINOPRIL 5 MG/1
TABLET ORAL
Qty: 60 TABLET | Refills: 11 | Status: SHIPPED | OUTPATIENT
Start: 2017-09-12 | End: 2017-10-26

## 2017-09-12 RX ORDER — LISINOPRIL 5 MG/1
TABLET ORAL
Qty: 30 TABLET | Refills: 1 | COMMUNITY
Start: 2017-09-12 | End: 2017-09-12

## 2017-09-12 NOTE — TELEPHONE ENCOUNTER
Patient called to let you know she has 23 lisinopril tablets left  (also states she has 12 eliquis & 26 metoprolol tablets)

## 2017-09-12 NOTE — TELEPHONE ENCOUNTER
In preparation of office visit will generate order for CT scan to follow-up on sclerotic bone lesion right sixth rib.

## 2017-09-12 NOTE — TELEPHONE ENCOUNTER
Please call in refill for patient's Eliquis. 2.5 mg.  Quantity #60.  1 twice daily. As needed refills. Also okay to call in refill for metoprolol 5 mg. Quantity #60.  1 p.o. twice daily. As needed refills.   For the lisinopril please call in refill for

## 2017-09-12 NOTE — PROGRESS NOTES
HPI:   Jennifer Buitrago is a 80year old female presents with the following problems. Patient here for blood pressure check. Repeat blood pressure 156/100. Second reading 160/96. Both with right arm. Patient is on metoprolol and lisinopril.   Tolerating than the uptake seen in degenerative changes present throughout the axial and appendicular skeleton and thus likely represents prominence related to patient positioning.   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   Dr. Jerson Tamayo   • Degenerative joint disease     (as per NG)   • Femur fracture ([address]     Right Femur fractured (as per NG)   • Hiatal hernia     (as per NG)   • Lipid sc Never Smoker                                                              Smokeless tobacco: Never Used                      Alcohol use: Yes              Comment: Occasionally, Wine, 1 glass (as per NG)         REVIEW OF SYSTEMS:   GENERAL:  feels well. extremity, subsequent encounter  Referred  to Dr. Yg Payton. 5. Abnormal CXR  See above. Patient given order for CT chest if she would like to follow-up on this rib abnormality. I am not sure of the benefits of diagnosis and treatment at her age.   Sh

## 2017-09-18 ENCOUNTER — OFFICE VISIT (OUTPATIENT)
Dept: SURGERY | Facility: CLINIC | Age: 82
End: 2017-09-18

## 2017-09-18 ENCOUNTER — TELEPHONE (OUTPATIENT)
Dept: SURGERY | Facility: CLINIC | Age: 82
End: 2017-09-18

## 2017-09-18 DIAGNOSIS — I83.012 VENOUS STASIS ULCER OF RIGHT CALF LIMITED TO BREAKDOWN OF SKIN WITH VARICOSE VEINS (HCC): ICD-10-CM

## 2017-09-18 DIAGNOSIS — S81.801A UNSPECIFIED OPEN WOUND, RIGHT LOWER LEG, INITIAL ENCOUNTER: Primary | ICD-10-CM

## 2017-09-18 DIAGNOSIS — L97.211 VENOUS STASIS ULCER OF RIGHT CALF LIMITED TO BREAKDOWN OF SKIN WITH VARICOSE VEINS (HCC): ICD-10-CM

## 2017-09-18 PROCEDURE — 99203 OFFICE O/P NEW LOW 30 MIN: CPT | Performed by: PLASTIC SURGERY

## 2017-09-18 PROCEDURE — G0463 HOSPITAL OUTPT CLINIC VISIT: HCPCS | Performed by: PLASTIC SURGERY

## 2017-09-18 NOTE — TELEPHONE ENCOUNTER
Pt's home health RN, Connie Dillon, was called ([phone]) and informed that after Dr. Faisal Collazo evaluated the pt silverlon, gauze, kerlix and spandage were applied to the R lower leg.   Pt's home health RN informed that there are not to be any dressing valencia

## 2017-09-18 NOTE — PROGRESS NOTES
After pt was evaluated by Dr. Nancy Kovacs, verbal orders to apply silverlon, gauze, kerlix and spandage to R Lower leg. Dressing applied as ordered.   Pt was asked if she would like our office to call the wound clinic and schedule her appt and she stated th

## 2017-09-18 NOTE — TELEPHONE ENCOUNTER
Received call from Manohar Snowden pt's home RN from [facility]. She called to tell us how  pt is currently treating right lower leg ulcers, she is a new pt in our office today. For past week pt has been cleaning ulcers with . 9NS 3 times a day then applyi

## 2017-09-18 NOTE — H&P
Romayne Ponder is a 80year old female that presents with Patient presents with:  Wound: R lower leg  .     REFERRED BY:  Sabine Mcdonough      Pt's home health RN , Barbara Addison would like to be updated on pt's wound care, [phone]    Pacemaker: No  Latex Allergy mouth 2 (two) times daily. Disp: 60 tablet Rfl: 11   lisinopril 5 MG Oral Tab Take 2 tablets every morning. Disp: 60 tablet Rfl: 11   metoprolol Tartrate 25 MG Oral Tab Take 1 tablet (25 mg total) by mouth 2 (two) times daily.  Disp: 60 tablet Rfl: 11   Phillip Coffee and tea; 1 cup (as per NG)    Left Handed No    Right Handed Yes    Currently spends a great deal of time in the sun No    History of tanning No    Bad sunburns in the past No    Tanning Salons in the Past No    Hx of Radiation Treatments No     Soc

## 2017-09-20 ENCOUNTER — OFFICE VISIT (OUTPATIENT)
Dept: CARDIOLOGY CLINIC | Facility: HOSPITAL | Age: 82
End: 2017-09-20
Attending: INTERNAL MEDICINE
Payer: MEDICARE

## 2017-09-20 VITALS
DIASTOLIC BLOOD PRESSURE: 94 MMHG | SYSTOLIC BLOOD PRESSURE: 141 MMHG | BODY MASS INDEX: 23 KG/M2 | OXYGEN SATURATION: 96 % | HEART RATE: 90 BPM | WEIGHT: 137.13 LBS

## 2017-09-20 DIAGNOSIS — R60.0 LEG EDEMA, RIGHT: ICD-10-CM

## 2017-09-20 DIAGNOSIS — I10 ESSENTIAL HYPERTENSION: ICD-10-CM

## 2017-09-20 DIAGNOSIS — I48.91 ATRIAL FIBRILLATION (HCC): Primary | ICD-10-CM

## 2017-09-20 DIAGNOSIS — I48.19 PERSISTENT ATRIAL FIBRILLATION (HCC): ICD-10-CM

## 2017-09-20 LAB
ANION GAP SERPL CALC-SCNC: 6 MMOL/L (ref 0–18)
BUN SERPL-MCNC: 17 MG/DL (ref 8–20)
BUN/CREAT SERPL: 20 (ref 10–20)
CALCIUM SERPL-MCNC: 8.7 MG/DL (ref 8.5–10.5)
CHLORIDE SERPL-SCNC: 103 MMOL/L (ref 95–110)
CO2 SERPL-SCNC: 27 MMOL/L (ref 22–32)
CREAT SERPL-MCNC: 0.85 MG/DL (ref 0.5–1.5)
GLUCOSE SERPL-MCNC: 92 MG/DL (ref 70–99)
OSMOLALITY UR CALC.SUM OF ELEC: 283 MOSM/KG (ref 275–295)
POTASSIUM SERPL-SCNC: 4.6 MMOL/L (ref 3.3–5.1)
SODIUM SERPL-SCNC: 136 MMOL/L (ref 136–144)

## 2017-09-20 PROCEDURE — 99214 OFFICE O/P EST MOD 30 MIN: CPT | Performed by: NURSE PRACTITIONER

## 2017-09-20 PROCEDURE — 80048 BASIC METABOLIC PNL TOTAL CA: CPT | Performed by: NURSE PRACTITIONER

## 2017-09-20 PROCEDURE — 99212 OFFICE O/P EST SF 10 MIN: CPT | Performed by: NURSE PRACTITIONER

## 2017-09-20 PROCEDURE — 36415 COLL VENOUS BLD VENIPUNCTURE: CPT | Performed by: NURSE PRACTITIONER

## 2017-09-20 NOTE — PROGRESS NOTES
[facility] Patient Status:  Outpatient    1922 MRN [id number]   Location Milly Staff, MD Donna Lowry is a 80year old female who presents to clinic for hosp 09/20/2017 09:38 AM    09/20/2017 09:38 AM   CO2 27 09/20/2017 09:38 AM   GLU 92 09/20/2017 09:38 AM   CA 8.7 09/20/2017 09:38 AM   ALB 3.4 (L) 09/05/2017 07:58 AM   ALKPHO 98 09/05/2017 07:58 AM   BILT 0.6 09/05/2017 07:58 AM   TP 6.6 09/05/2017 07: Here for follow up for recurrent atrial fib with RVR and  HTN. Also getting treated for right heal wound. Rate controlled on metoprolol tartrate and anticoagulated on Eliquis 2.5 mg twice daily.  Denies cp, sob, heart racing, dizziness,near syncope or fal (not homemade), some cereal, vegetable juice, canned vegetables, lunch meats, processed meats like hotdogs, sausage, fisher, pepperoni, soy sauce, pre-packaged rice or potatoes. Please remember to read nutrition labels for sodium content.      · Limit caffei

## 2017-09-20 NOTE — PATIENT INSTRUCTIONS
Continue all your same medications    Call if having any dizziness, lightheadedness, heart racing, palpitations, chest pain, shortness of breath, coughing, swelling or  weight gain    Follow up with Dr. Jeremiah Howell in one month, call to make an appointment

## 2017-09-22 ENCOUNTER — TELEPHONE (OUTPATIENT)
Dept: INTERNAL MEDICINE CLINIC | Facility: CLINIC | Age: 82
End: 2017-09-22

## 2017-09-22 NOTE — TELEPHONE ENCOUNTER
Manohar Snowden from [facility] Is calling pt.  Has an elevated BP of 132/94 at 10 am   At the end of the visit her /88    Ph. # [phone]    Routed to clinical

## 2017-09-26 ENCOUNTER — NURSE ONLY (OUTPATIENT)
Dept: WOUND CARE | Facility: HOSPITAL | Age: 82
End: 2017-09-26
Attending: CLINICAL NURSE SPECIALIST
Payer: MEDICARE

## 2017-09-26 ENCOUNTER — OFFICE VISIT (OUTPATIENT)
Dept: INTERNAL MEDICINE CLINIC | Facility: CLINIC | Age: 82
End: 2017-09-26

## 2017-09-26 VITALS
BODY MASS INDEX: 22.82 KG/M2 | HEIGHT: 65 IN | HEART RATE: 84 BPM | TEMPERATURE: 98 F | SYSTOLIC BLOOD PRESSURE: 138 MMHG | WEIGHT: 137 LBS | DIASTOLIC BLOOD PRESSURE: 86 MMHG

## 2017-09-26 DIAGNOSIS — I48.91 ATRIAL FIBRILLATION, UNSPECIFIED TYPE (HCC): ICD-10-CM

## 2017-09-26 DIAGNOSIS — L98.9 SKIN LESION OF CHEEK: ICD-10-CM

## 2017-09-26 DIAGNOSIS — S81.801A WOUND OF RIGHT LOWER EXTREMITY, INITIAL ENCOUNTER: Primary | ICD-10-CM

## 2017-09-26 DIAGNOSIS — I10 ESSENTIAL HYPERTENSION: Primary | ICD-10-CM

## 2017-09-26 DIAGNOSIS — R91.8 ABNORMALITY OF LUNG ON CXR: ICD-10-CM

## 2017-09-26 PROCEDURE — G0463 HOSPITAL OUTPT CLINIC VISIT: HCPCS | Performed by: INTERNAL MEDICINE

## 2017-09-26 PROCEDURE — 99214 OFFICE O/P EST MOD 30 MIN: CPT | Performed by: INTERNAL MEDICINE

## 2017-09-26 PROCEDURE — 99214 OFFICE O/P EST MOD 30 MIN: CPT

## 2017-09-26 PROCEDURE — 99213 OFFICE O/P EST LOW 20 MIN: CPT | Performed by: CLINICAL NURSE SPECIALIST

## 2017-09-26 PROCEDURE — 99203 OFFICE O/P NEW LOW 30 MIN: CPT | Performed by: CLINICAL NURSE SPECIALIST

## 2017-09-26 PROCEDURE — 29581 APPL MULTLAYER CMPRN SYS LEG: CPT

## 2017-09-26 NOTE — PROGRESS NOTES
Claudia Herring is a 80year old female. HPI:   Patient presents with: Follow - Up: 2 week F/U Lisinopril was increased - patient will go to wound clinic after that for right leg sores       Patient here for blood pressure follow-up.   Blood pressure control cell carcinoma of right cheek 2013    EDC   • Basal cell carcinoma, forehead 2013    Mohs.   Dr. Trang Chowdhury   • Cancer [facility])    • Degenerative joint disease     (as per NG)   • Femur fracture ([address]     Right Femur fractured (as per NG)   • Heart disease    • murmur. S1 and S2 normal  GI:  good BS's,  no masses,   HSM or tenderness  EXTREMITIES : no cyanosis, clubbing or edema    ASSESSMENT AND PLAN:     1. Essential hypertension  Pressure under good control. Hypertensive therapy. Follow-up in 1 month.     2

## 2017-09-26 NOTE — PROGRESS NOTES
Subjective    Chief Complaint  This information was obtained from the patient  The patient is new to the [address] [facility] here for an initial visit for the evaluation and management of non-healing wound(s). Right lower leg wound.  She is not sure when it started Eyes: Vision Changes  Respiratory: Cough, Shortness of Breath, Wheezing  Cardiovascular (Central/Peripheral): Chest Pain, Dyspnea on Exertion  Gastrointestinal (GI): Change in Bowel Habits  Genitourinary (): Frequency  Musculoskeletal: Decreased Activity Wound #1 Right, Proximal, Anterior Lower Leg is a chronic Partial Thickness ? ? and has received a status of Not Healed.  Initial wound encounter measurements are 0.7cm length x 1.5cm width x 0.1cm depth, with an area of 1.05 sq cm and a volume of 0.105 cubi Wound #3 Right, Medial, Posterior Lower Leg is a chronic Partial Thickness Venous Ulcer and has received a status of Not Healed.  Initial wound encounter measurements are 7cm length x 6.5cm width x 0.1cm depth, with an area of 45.5 sq cm and a volume of 4.5 Dependent Rubor: No   Hyperpigmentation: Yes   Lipodermatosclerosis: No  Right Extremity colors, hair growth, and conditions:   Extremity Color: Pigmented   Hair Growth on Extremity: No   Temperature of Extremity: Warm   Capillary Refill: < 3 seconds   Norris Compression Therapy:  Stockinette 3\"  Artiflex 10 cm  Artiflex 15 cm  Comprilan 6 cm  Comprilan 8 cm. Wound #2 Right, Posterior Lower Leg     Wound Cleansing & Dressings  Clean wound with Normal Saline or Wound Cleanser. May shower with protection. Applied Secondary Wound Dressing. using ABD (1)   Dressing secured with non-allergenic tape/stockinet/wrap. using Kerlix (1), Silk tape (1)   Applied Compression device.  using Comprilan (1)   Compression wrapping  Moisturizing lotion applied to skin   Stoc

## 2017-09-28 ENCOUNTER — APPOINTMENT (OUTPATIENT)
Dept: WOUND CARE | Facility: HOSPITAL | Age: 82
End: 2017-09-28
Attending: CLINICAL NURSE SPECIALIST
Payer: MEDICARE

## 2017-09-28 DIAGNOSIS — S81.801D LEG WOUND, RIGHT, SUBSEQUENT ENCOUNTER: Primary | ICD-10-CM

## 2017-09-28 PROCEDURE — 29581 APPL MULTLAYER CMPRN SYS LEG: CPT | Performed by: CLINICAL NURSE SPECIALIST

## 2017-09-28 NOTE — PROGRESS NOTES
Subjective    Chief Complaint  This information was obtained from the patient  The patient is new to the [address] [facility] here for an initial visit for the evaluation and management of non-healing wound(s). Right lower leg wound.  She is not sure when it started Healed. No tunneling has been noted. No sinus tract has been noted. No undermining has been noted. There is a scant amount of serous drainage noted which has no odor. The patient reports a wound pain of level 0/10.  The wound margin is attached to wound bas discomfort or toes purple then remove one layer wrapping if discomfort continues then remove other wrapping. Patient's daughter verbalized understanding of instructions.    Procedures    Wound #1  Wound #1 (Venous Ulcer) is located on the right, proximal, a infection and wound healing. Patient to follow up with Tasha Sigala from [facility] for options of compression garments.      Electronic Signature(s)  Signed By: Date:  Gael Dominguez 09/28/2017 2:07:50 PM       Entered By: Gael Dominguez on 09/28/2017 2:07:33 3\" (1)  Compression used: using Artiflex 10 cm (1), Artiflex 15 cm (1), Comprilan 08 cm (1), Comprilan 10 cm (1)  Wound #3 (Right, Medial, Posterior Lower Leg)  . Wound Treatment Note  Assessed patient’s pain status and effectiveness of pain management cleo

## 2017-10-02 ENCOUNTER — APPOINTMENT (OUTPATIENT)
Dept: WOUND CARE | Facility: HOSPITAL | Age: 82
End: 2017-10-02
Attending: CLINICAL NURSE SPECIALIST
Payer: MEDICARE

## 2017-10-02 DIAGNOSIS — S81.801D WOUND OF RIGHT LOWER EXTREMITY, SUBSEQUENT ENCOUNTER: Primary | ICD-10-CM

## 2017-10-02 PROCEDURE — 29581 APPL MULTLAYER CMPRN SYS LEG: CPT | Performed by: CLINICAL NURSE SPECIALIST

## 2017-10-02 NOTE — PROGRESS NOTES
Subjective    Chief Complaint  This information was obtained from the patient  The patient is new to the [address] [facility] here for an initial visit for the evaluation and management of non-healing wound(s). Right lower leg wound.  She is not sure when it started Wound #2 Right, Posterior Lower Leg is a chronic Partial Thickness Venous Ulcer and has received a status of Not Healed. Subsequent wound encounter measurements are 2cm length x 2cm width x 0.1cm depth, with an area of 4 sq cm and a volume of 0.4 cubic cm. Patient's RLE wounds improved in comparison to previous wound measurements. Wounds granular and epithelialization noted. Vashe soaks and acticoat dressings to decrease bioburden to wounds. Comprilan compression wraps to manage edema.  Continue protein in Naun Compression used: using Artiflex 10 cm (1), Artiflex 15 cm (1), Comprilan 08 cm (1), Comprilan 10 cm (1)  Wound #2 (Right, Posterior Lower Leg)  . Wound Treatment Note  Assessed patient’s pain status and effectiveness of pain management plan.   Limb cleansed

## 2017-10-05 ENCOUNTER — APPOINTMENT (OUTPATIENT)
Dept: WOUND CARE | Facility: HOSPITAL | Age: 82
End: 2017-10-05
Attending: CLINICAL NURSE SPECIALIST
Payer: MEDICARE

## 2017-10-05 DIAGNOSIS — S81.801D LEG WOUND, RIGHT, SUBSEQUENT ENCOUNTER: Primary | ICD-10-CM

## 2017-10-05 PROCEDURE — 29581 APPL MULTLAYER CMPRN SYS LEG: CPT | Performed by: CLINICAL NURSE SPECIALIST

## 2017-10-05 NOTE — PROGRESS NOTES
Subjective    Chief Complaint  This information was obtained from the patient  The patient is new to the [address] [facility] here for an initial visit for the evaluation and management of non-healing wound(s). Right lower leg wound.  She is not sure when it started Venous Ulcer and has received a status of Not Healed. No tunneling has been noted. No sinus tract has been noted. No undermining has been noted. There is a scant amount of serous drainage noted which has no odor.  The patient reports a wound pain of level 0 located on the right, proximal, anterior lower leg. A Multi Layer Compression Wrap procedure was performed for the lower right extremity by CORY King. Rosary Jon was applied with .  The procedure was tolerated well with pain level of 0 using 0.9% normal saline (1)  Performed antiseptic soak to wound: using Vashe (1)  Antiseptic soak time: using 10 minutes (1)  Applied topical product to starr-wound area avoiding wound base.  using Betamethasone valerate 0.1% cream (1), Vaseline (1)  Applie

## 2017-10-09 ENCOUNTER — APPOINTMENT (OUTPATIENT)
Dept: WOUND CARE | Facility: HOSPITAL | Age: 82
End: 2017-10-09
Attending: CLINICAL NURSE SPECIALIST
Payer: MEDICARE

## 2017-10-09 DIAGNOSIS — S81.801D LEG WOUND, RIGHT, SUBSEQUENT ENCOUNTER: Primary | ICD-10-CM

## 2017-10-09 PROCEDURE — 29581 APPL MULTLAYER CMPRN SYS LEG: CPT | Performed by: CLINICAL NURSE SPECIALIST

## 2017-10-09 NOTE — PROGRESS NOTES
Subjective    Chief Complaint  This information was obtained from the patient  The patient is new to the [address] [facility],[address] here for an initial visit for the evaluation and management of non-healing wound(s). Right lower leg wound.  She is not sure when it started The periwound skin exhibited: Dry/Scaly, Erythema, Hemosiderosis. The temperature of the periwound skin is WNL. Periwound skin does not exhibit signs or symptoms of infection. Local Pulse is Doppler.     Wound #2 Right, Posterior Lower Leg is a chronic Part Active Problems    ICD-10  S81.801A - Unspecified open wound, right lower leg, initial encounter  I87.2 - Venous insufficiency (chronic) (peripheral)        Patient's RLE wounds granular and improved.   Patient complained of burning over the weekend to [facility] Artiflex 10 cm  Artiflex 15 cm  Comprilan 8 cm. Comprilan 10 cm.      Wound #3 Right, Medial, Posterior Lower Leg     Wound Cleansing & Dressings  Clean wound with Normal Saline or Wound Cleanser. - apply vaseline  Hydrofera ready  ABD pad  Cover dressing Assessed patient’s pain status and effectiveness of pain management plan. Limb cleansed using Soap and water (1)  Cleansed wound and periwound with non-cytotoxic agent.  using 0.9% normal saline (1)  Performed antiseptic soak to wound: using Vashe (1)  Ant

## 2017-10-10 ENCOUNTER — HOSPITAL ENCOUNTER (OUTPATIENT)
Dept: CT IMAGING | Facility: HOSPITAL | Age: 82
Discharge: HOME | End: 2017-10-10
Attending: INTERNAL MEDICINE
Payer: MEDICARE

## 2017-10-10 DIAGNOSIS — R91.8 ABNORMALITY OF LUNG ON CXR: ICD-10-CM

## 2017-10-10 PROCEDURE — 71250 CT THORAX DX C-: CPT | Performed by: INTERNAL MEDICINE

## 2017-10-11 ENCOUNTER — TELEPHONE (OUTPATIENT)
Dept: INTERNAL MEDICINE CLINIC | Facility: CLINIC | Age: 82
End: 2017-10-11

## 2017-10-11 NOTE — TELEPHONE ENCOUNTER
Ana Maria Forman from [facility] called to report a elevated BP. It was 166/64. She took it a second time and it was 152/96. All other vitals are normal.   She is taking all meds as directed. Ana Maria Forman can be reached at [phone].

## 2017-10-11 NOTE — TELEPHONE ENCOUNTER
Message relayed to Radhika Serrano RN with [facility], who verbalized understanding. Nothing further at this time.

## 2017-10-11 NOTE — TELEPHONE ENCOUNTER
To Dr Taras Rodgers  Regarding pt has 2 elevated b/p today   All other vitals normal  Taking meds as directed / Sumit Nuñez  [facility]  B/P  166/64  152/96

## 2017-10-11 NOTE — TELEPHONE ENCOUNTER
Please let patient know her CT chest came out fairly good. There was very mild increase in growth in the bone deformity noted on recent CXR. I think this is probably  a benign process. For now nothing else needs to be done.

## 2017-10-12 ENCOUNTER — NURSE ONLY (OUTPATIENT)
Dept: WOUND CARE | Facility: HOSPITAL | Age: 82
End: 2017-10-12
Attending: CLINICAL NURSE SPECIALIST
Payer: MEDICARE

## 2017-10-12 DIAGNOSIS — L97.919 VENOUS ULCER OF RIGHT LEG (HCC): ICD-10-CM

## 2017-10-12 DIAGNOSIS — I83.019 VENOUS ULCER OF RIGHT LEG (HCC): ICD-10-CM

## 2017-10-12 DIAGNOSIS — I87.2 VENOUS (PERIPHERAL) INSUFFICIENCY: Primary | ICD-10-CM

## 2017-10-12 PROCEDURE — 29581 APPL MULTLAYER CMPRN SYS LEG: CPT

## 2017-10-12 NOTE — PROGRESS NOTES
Subjective    Chief Complaint  This information was obtained from the patient  The patient is new to the [address] [facility],[address] here for an initial visit for the evaluation and management of non-healing wound(s). Right lower leg wound.  She is not sure when it started tunneling has been noted. No sinus tract has been noted. No undermining has been noted. There is a scant amount of serous drainage noted which has no odor. The patient reports a wound pain of level 0/10. The wound margin is attached to wound base.  Wound be from [facility] and was measured for a compression garment . Procedures    Wound #1  Wound #1 (Venous Ulcer) is located on the right, proximal, anterior lower leg.  A Multi Layer Compression Wrap procedure was performed for the lower right extrem cleansed using Soap and water (1)   Cleansed wound and periwound with non-cytotoxic agent.  using 0.9% normal saline (1)   Performed antiseptic soak to wound: using Vashe (1)   Antiseptic soak time: using 10 minutes (1)   Applied topical product to starr-wou

## 2017-10-16 ENCOUNTER — TELEPHONE (OUTPATIENT)
Dept: INTERNAL MEDICINE CLINIC | Facility: CLINIC | Age: 82
End: 2017-10-16

## 2017-10-16 ENCOUNTER — APPOINTMENT (OUTPATIENT)
Dept: WOUND CARE | Facility: HOSPITAL | Age: 82
End: 2017-10-16
Attending: NURSE PRACTITIONER
Payer: MEDICARE

## 2017-10-16 DIAGNOSIS — I83.019 VENOUS ULCER OF RIGHT LEG (HCC): Primary | ICD-10-CM

## 2017-10-16 DIAGNOSIS — L97.919 VENOUS ULCER OF RIGHT LEG (HCC): Primary | ICD-10-CM

## 2017-10-16 PROCEDURE — 29581 APPL MULTLAYER CMPRN SYS LEG: CPT | Performed by: CLINICAL NURSE SPECIALIST

## 2017-10-16 NOTE — PROGRESS NOTES
Subjective    Chief Complaint  This information was obtained from the patient  The patient is new to the [address] [facility],[address] here for an initial visit for the evaluation and management of non-healing wound(s). Right lower leg wound.  She is not sure when it started Wound #1 Right, Proximal, Anterior Lower Leg is a chronic Partial Thickness Venous Ulcer and has received a status of Not Healed.  Subsequent wound encounter measurements are 0.2cm length x 0.2cm width x 0.1cm depth, with an area of 0.04 sq cm and a volume Wound #3 Right, Medial, Posterior Lower Leg is a chronic Partial Thickness Venous Ulcer and has received a status of Not Healed.  Subsequent wound encounter measurements are 1.1cm length x 0.3cm width x 0.1cm depth, with an area of 0.33 sq cm and a volume o Patient RLE wounds granular with epithelialization noted. Wounds improving in comparison to previous wound measurements. Betamethasone cream applied to erythema areas with improvement and lessening redness. Hydrofera blue to decrease bioburden of wounds.  C Applied topical product to starr-wound area avoiding wound base. using Betamethasone valerate 0.1% cream (1)  Applied Primary Wound Dressing. using Hydrofera Blue Classic 4x4 (1)  Applied Secondary Wound Dressing.  using ABD (1)  Dressing secured with non-al Dressing secured with non-allergenic tape/stockinet/wrap. using Kerlix (1), Silk tape (1)  Applied Compression device.  using Comprilan (1)  Compression wrapping  Moisturizing lotion applied to skin  Stockinette applied using 3\" (1)  Compression used: in

## 2017-10-19 ENCOUNTER — NURSE ONLY (OUTPATIENT)
Dept: WOUND CARE | Facility: HOSPITAL | Age: 82
End: 2017-10-19
Attending: CLINICAL NURSE SPECIALIST
Payer: MEDICARE

## 2017-10-19 DIAGNOSIS — L97.919 VENOUS ULCER OF RIGHT LEG (HCC): Primary | ICD-10-CM

## 2017-10-19 DIAGNOSIS — I83.019 VENOUS ULCER OF RIGHT LEG (HCC): Primary | ICD-10-CM

## 2017-10-19 PROCEDURE — 99212 OFFICE O/P EST SF 10 MIN: CPT | Performed by: CLINICAL NURSE SPECIALIST

## 2017-10-19 PROCEDURE — 29581 APPL MULTLAYER CMPRN SYS LEG: CPT

## 2017-10-19 NOTE — PROGRESS NOTES
Subjective    Chief Complaint  This information was obtained from the patient  The patient is new to the [address] [facility],[address] here for an initial visit for the evaluation and management of non-healing wound(s). Right lower leg wound.  She is not sure when it started tunneling has been noted. No sinus tract has been noted. No undermining has been noted. There is a scant amount of serous drainage noted which has no odor. The patient reports a wound pain of level 0/10. The wound margin is attached to wound base.  Wound be so it's available when she is ready to be transitioned to it when she achieves wound closure. Patient and daughter verbalized understanding. Procedures    Wound #1  Wound #1 (Venous Ulcer) is located on the right, proximal, anterior lower leg.  A Multi applied to skin   Stockinette applied using 3\" (1)   Compression used: using Artiflex 10 cm (1), Artiflex 15 cm (1), Comprilan 08 cm (1), Comprilan 10 cm (1)   Wound #2 (Right, Posterior Lower Leg)  . Wound Treatment Note  Assessed patient’s pain status an

## 2017-10-23 ENCOUNTER — NURSE ONLY (OUTPATIENT)
Dept: WOUND CARE | Facility: HOSPITAL | Age: 82
End: 2017-10-23
Attending: CLINICAL NURSE SPECIALIST
Payer: MEDICARE

## 2017-10-23 DIAGNOSIS — L97.919 VENOUS ULCER OF RIGHT LEG (HCC): Primary | ICD-10-CM

## 2017-10-23 DIAGNOSIS — I83.019 VENOUS ULCER OF RIGHT LEG (HCC): Primary | ICD-10-CM

## 2017-10-23 PROCEDURE — 29581 APPL MULTLAYER CMPRN SYS LEG: CPT

## 2017-10-23 NOTE — PROGRESS NOTES
Subjective    Chief Complaint  This information was obtained from the patient  The patient is new to the [address] [facility],[address] here for an initial visit for the evaluation and management of non-healing wound(s). Right lower leg wound.  She is not sure when it started Wound #2 Right, Posterior Lower Leg is a chronic Venous Ulcer and has received an outcome of Resolved. Measurements are 0cm length x 0cm width x 0cm depth, with an area of 0 sq cm and a volume of 0 cubic cm. No tunneling has been noted.  No sinus tract has Patient seen today for wound care follow-up. Right lower leg skin is dry and intact. Wounds are 100% re-epithelialized. Patient states that her leg was itching last night. Continued applying betamethasone valerate cream for itching and vaseline to [facility] Limb cleansed using Soap and water (1)   Cleansed wound and periwound with non-cytotoxic agent. using 0.9% normal saline (1)   Applied Primary Wound Dressing.  using ABD (1), Vaseline Gauze (1)   Dressing secured with non-allergenic tape/stockinet/wrap. usi

## 2017-10-26 ENCOUNTER — OFFICE VISIT (OUTPATIENT)
Dept: INTERNAL MEDICINE CLINIC | Facility: CLINIC | Age: 82
End: 2017-10-26

## 2017-10-26 VITALS
DIASTOLIC BLOOD PRESSURE: 70 MMHG | OXYGEN SATURATION: 98 % | TEMPERATURE: 98 F | HEART RATE: 87 BPM | WEIGHT: 132 LBS | BODY MASS INDEX: 21.99 KG/M2 | SYSTOLIC BLOOD PRESSURE: 168 MMHG | HEIGHT: 65 IN

## 2017-10-26 DIAGNOSIS — I10 ESSENTIAL HYPERTENSION: Primary | ICD-10-CM

## 2017-10-26 DIAGNOSIS — R93.89 ABNORMAL CT OF THE CHEST: ICD-10-CM

## 2017-10-26 DIAGNOSIS — S81.801S LEG WOUND, RIGHT, SEQUELA: ICD-10-CM

## 2017-10-26 DIAGNOSIS — I48.91 ATRIAL FIBRILLATION, UNSPECIFIED TYPE (HCC): ICD-10-CM

## 2017-10-26 DIAGNOSIS — R20.0 NUMBNESS AND TINGLING IN BOTH HANDS: ICD-10-CM

## 2017-10-26 DIAGNOSIS — Z00.00 ROUTINE HEALTH MAINTENANCE: ICD-10-CM

## 2017-10-26 DIAGNOSIS — R91.1 PULMONARY NODULE: ICD-10-CM

## 2017-10-26 DIAGNOSIS — R20.2 NUMBNESS AND TINGLING IN BOTH HANDS: ICD-10-CM

## 2017-10-26 PROCEDURE — 99215 OFFICE O/P EST HI 40 MIN: CPT | Performed by: INTERNAL MEDICINE

## 2017-10-26 PROCEDURE — G0463 HOSPITAL OUTPT CLINIC VISIT: HCPCS | Performed by: INTERNAL MEDICINE

## 2017-10-26 RX ORDER — INFLUENZA A VIRUSA/MICHIGAN/45/2015 X-275 (H1N1) ANTIGEN (FORMALDEHYDE INACTIVATED), INFLUENZA A VIRUS A/HONG KONG/4801/2014 X-263B (H3N2) ANTIGEN (FORMALDEHYDE INACTIVATED), AND INFLUENZA B VIRUS B/BRISBANE/60/2008 ANTIGEN (FORMALDEHYDE INACTIVATED) 60; 60; 60 UG/.5ML; UG/.5ML; UG/.5ML
INJECTION, SUSPENSION INTRAMUSCULAR
Refills: 0 | COMMUNITY
Start: 2017-10-13 | End: 2017-10-26 | Stop reason: ALTCHOICE

## 2017-10-26 RX ORDER — LISINOPRIL 5 MG/1
TABLET ORAL
Qty: 90 TABLET | Refills: 11 | Status: SHIPPED | OUTPATIENT
Start: 2017-10-26 | End: 2018-05-10

## 2017-10-26 NOTE — PROGRESS NOTES
HPI:   Quyen Huertas is a 80year old female presents with the following problems. Patient presents for follow-up atrial fibrillation. Rate seems to be controlled. No chest pain or shortness of breath. Blood pressure is elevated.   Patient does take for both the right nodule in this area. However patient. I would not think she would be a candidate for biopsy. Lungs do not show any other abnormalities. There was calcific aortic atherosclerosis. It was mildly enlarged.     Patient is following up wi total) by mouth 2 (two) times daily. Disp: 60 tablet Rfl: 11   Calcium Carbonate-Vit D-Min (CALCIUM 600 + MINERALS OR) Take 1 tablet by mouth daily. Disp:  Rfl:    acetaminophen 325 MG Oral Tab Take 325 mg by mouth every 6 (six) hours as needed for Pain.  D Osmolality 283 275 - 295 mOsm/kg   GFR, Non-African American >60 >=60   GFR, -American >60 >=60      Social History:   Smoking status: Never Smoker                                                              Smokeless tobacco: Never Used Essential hypertension  Blood pressure not controlled. Will add enalapril 5 mg in p.m. Follow-up in 2 weeks. Patient to bring her blood pressure meter with her. 2. Atrial fibrillation, unspecified type ([address]  Rate controlled continue metoprolol.     3

## 2017-10-27 ENCOUNTER — APPOINTMENT (OUTPATIENT)
Dept: WOUND CARE | Facility: HOSPITAL | Age: 82
End: 2017-10-27
Attending: CLINICAL NURSE SPECIALIST
Payer: MEDICARE

## 2017-10-27 DIAGNOSIS — L97.919 VENOUS ULCER OF RIGHT LEG (HCC): Primary | ICD-10-CM

## 2017-10-27 DIAGNOSIS — I83.019 VENOUS ULCER OF RIGHT LEG (HCC): Primary | ICD-10-CM

## 2017-10-27 PROCEDURE — 29581 APPL MULTLAYER CMPRN SYS LEG: CPT | Performed by: CLINICAL NURSE SPECIALIST

## 2017-10-27 NOTE — PROGRESS NOTES
Subjective    Chief Complaint  This information was obtained from the patient  The patient is new to the [address] [facility],[address] here for an initial visit for the evaluation and management of non-healing wound(s). Right lower leg wound.  She is not sure when it started and no epithelialization present. The periwound skin texture is normal. The periwound skin moisture is normal. The periwound skin color is normal. The temperature of the periwound skin is WNL.  Periwound skin does not exhibit signs or symptoms of infectio healing. Patient to be transitioned into velcro compression wrap when RLE wound healed.      Electronic Signature(s)  Signed By: Date:  Teodoro Kenny 10/27/2017 3:16:53 PM       Entered By: Teodoro Kenny on 10/27/2017 3:16:37 PM   Treatment Notes Summary  W

## 2017-11-01 ENCOUNTER — NURSE ONLY (OUTPATIENT)
Dept: WOUND CARE | Facility: HOSPITAL | Age: 82
End: 2017-11-01
Attending: CLINICAL NURSE SPECIALIST
Payer: MEDICARE

## 2017-11-01 DIAGNOSIS — L97.919 VENOUS ULCER OF RIGHT LEG (HCC): Primary | ICD-10-CM

## 2017-11-01 DIAGNOSIS — I83.019 VENOUS ULCER OF RIGHT LEG (HCC): Primary | ICD-10-CM

## 2017-11-01 PROCEDURE — 29581 APPL MULTLAYER CMPRN SYS LEG: CPT

## 2017-11-01 NOTE — PROGRESS NOTES
Subjective    Chief Complaint  This information was obtained from the patient  The patient is new to the [address] [facility],[address] here for an initial visit for the evaluation and management of non-healing wound(s). Right lower leg wound.  She is not sure when it started Right, Medial Lower Leg is a Partial Thickness Venous Ulcer and has received a status of Not Healed. Subsequent wound encounter measurements are 0.5cm length x 0.3cm width x 0.1cm depth, with an area of 0.15 sq cm and a volume of 0.015 cubic cm.  No tunneli Entered By: Ren Rodriges on 11/01/2017 10:58:33 AM      Treatment Notes Summary    Wound #6 (Right, Medial Lower Leg)  . Wound Treatment Note  Assessed patient’s pain status and effectiveness of pain management plan.    Limb cleansed using Soap and water (1)

## 2017-11-08 ENCOUNTER — NURSE ONLY (OUTPATIENT)
Dept: WOUND CARE | Facility: HOSPITAL | Age: 82
End: 2017-11-08
Attending: CLINICAL NURSE SPECIALIST
Payer: MEDICARE

## 2017-11-08 DIAGNOSIS — L97.919 VENOUS ULCER OF RIGHT LEG (HCC): Primary | ICD-10-CM

## 2017-11-08 DIAGNOSIS — I83.019 VENOUS ULCER OF RIGHT LEG (HCC): Primary | ICD-10-CM

## 2017-11-08 PROCEDURE — 99212 OFFICE O/P EST SF 10 MIN: CPT | Performed by: CLINICAL NURSE SPECIALIST

## 2017-11-08 PROCEDURE — 29581 APPL MULTLAYER CMPRN SYS LEG: CPT

## 2017-11-08 NOTE — PROGRESS NOTES
Subjective    Chief Complaint  This information was obtained from the patient  The patient is new to the [address] [facility],[address] here for an initial visit for the evaluation and management of non-healing wound(s). Right lower leg wound.  She is not sure when it started Wound #6 Right, Medial Lower Leg is a Partial Thickness Venous Ulcer and has received a status of Not Healed. Subsequent wound encounter measurements are 1.2cm length x 1.5cm width x 0.1cm depth, with an area of 1.8 sq cm and a volume of 0.18 cubic cm.  No Wound #6 (Venous Ulcer) is located on the right, medial lower leg. A Multi Layer Compression Wrap procedure was performed for the lower right extremity by Cecilio Rodriguez RN. Shereen Crenshaw was applied with .  The procedure was tolerated well with p Ezekiel Paniagua 11/8/2017 10:00:44 AM    Entered By: Dharmesh Chacon on 11/08/2017 11:05:45 AM   Treatment Notes Summary  Wound #6 (Right, Medial Lower Leg)  . Wound Treatment Note  Assessed patient’s pain status and effectiveness of pain management plan.   Clean

## 2017-11-10 ENCOUNTER — TELEPHONE (OUTPATIENT)
Dept: INTERNAL MEDICINE CLINIC | Facility: CLINIC | Age: 82
End: 2017-11-10

## 2017-11-10 ENCOUNTER — OFFICE VISIT (OUTPATIENT)
Dept: INTERNAL MEDICINE CLINIC | Facility: CLINIC | Age: 82
End: 2017-11-10

## 2017-11-10 ENCOUNTER — APPOINTMENT (OUTPATIENT)
Dept: LAB | Age: 82
End: 2017-11-10
Attending: INTERNAL MEDICINE
Payer: MEDICARE

## 2017-11-10 VITALS
SYSTOLIC BLOOD PRESSURE: 168 MMHG | OXYGEN SATURATION: 98 % | WEIGHT: 132 LBS | DIASTOLIC BLOOD PRESSURE: 100 MMHG | HEART RATE: 80 BPM | BODY MASS INDEX: 22 KG/M2 | TEMPERATURE: 98 F

## 2017-11-10 DIAGNOSIS — I10 ESSENTIAL HYPERTENSION: ICD-10-CM

## 2017-11-10 DIAGNOSIS — I10 ESSENTIAL HYPERTENSION: Primary | ICD-10-CM

## 2017-11-10 DIAGNOSIS — S81.801S LEG WOUND, RIGHT, SEQUELA: ICD-10-CM

## 2017-11-10 DIAGNOSIS — L98.9 SKIN LESION OF CHEEK: ICD-10-CM

## 2017-11-10 DIAGNOSIS — I48.91 ATRIAL FIBRILLATION, UNSPECIFIED TYPE (HCC): ICD-10-CM

## 2017-11-10 PROCEDURE — 36415 COLL VENOUS BLD VENIPUNCTURE: CPT

## 2017-11-10 PROCEDURE — G0463 HOSPITAL OUTPT CLINIC VISIT: HCPCS | Performed by: INTERNAL MEDICINE

## 2017-11-10 PROCEDURE — 99214 OFFICE O/P EST MOD 30 MIN: CPT | Performed by: INTERNAL MEDICINE

## 2017-11-10 PROCEDURE — 80048 BASIC METABOLIC PNL TOTAL CA: CPT

## 2017-11-10 RX ORDER — HYDROCHLOROTHIAZIDE 12.5 MG/1
12.5 TABLET ORAL DAILY
Qty: 30 TABLET | Refills: 12 | Status: SHIPPED | OUTPATIENT
Start: 2017-11-10 | End: 2018-11-05

## 2017-11-10 NOTE — PROGRESS NOTES
HPI:   Laura Silva is a 80year old female presents with the following problems. Patient feels generally well. She has no acute complaints.     She is somewhat frustrated with her right lower extremity to ulcers that are felt to be venous insufficiency Current Outpatient Prescriptions:  hydrochlorothiazide 12.5 MG Oral Tab Take 1 tablet (12.5 mg total) by mouth daily.  Disp: 30 tablet Rfl: 12   lisinopril 5 MG Oral Tab Take 2 tablets every morning and 1 tablet in the PM Disp: 90 tablet Rfl: 11   api for orders placed or performed in visit on 15/92/24  -BASIC METABOLIC PANEL (8)   Result Value Ref Range   Glucose 92 70 - 99 mg/dL   Sodium 136 136 - 144 mmol/L   Potassium 4.6 3.3 - 5.1 mmol/L   Chloride 103 95 - 110 mmol/L   CO2 27 22 - 32 mmol/L   BUN aware.      ASSESSMENT AND PLAN:         1. Essential hypertension  Pressure not controlled. Will add hydrochlorothiazide. Check BMP. Follow-up this coming Tuesday.  - BASIC METABOLIC PANEL (8); Future    2.  Atrial fibrillation, unspecified type ([facility]

## 2017-11-10 NOTE — TELEPHONE ENCOUNTER
Dr. Leo Noel message relayed to pt who verbalized understanding. Appt made for pt at [address] [facility] next Tuesday, 11/14.

## 2017-11-10 NOTE — TELEPHONE ENCOUNTER
Please reserve spot for patient this coming Tuesday at 4 PM.  She just left the office so probably have to call in a few hours. I spoke to the wound clinic. Please call patient later today to let her know time of appointment next Tuesday.   Please let her

## 2017-11-14 ENCOUNTER — OFFICE VISIT (OUTPATIENT)
Dept: INTERNAL MEDICINE CLINIC | Facility: CLINIC | Age: 82
End: 2017-11-14

## 2017-11-14 VITALS
WEIGHT: 131 LBS | BODY MASS INDEX: 21.83 KG/M2 | HEIGHT: 65 IN | HEART RATE: 102 BPM | TEMPERATURE: 98 F | SYSTOLIC BLOOD PRESSURE: 152 MMHG | DIASTOLIC BLOOD PRESSURE: 94 MMHG | OXYGEN SATURATION: 97 %

## 2017-11-14 DIAGNOSIS — L98.9 SKIN LESION OF CHEEK: ICD-10-CM

## 2017-11-14 DIAGNOSIS — I10 ESSENTIAL HYPERTENSION: Primary | ICD-10-CM

## 2017-11-14 DIAGNOSIS — I48.91 ATRIAL FIBRILLATION, UNSPECIFIED TYPE (HCC): ICD-10-CM

## 2017-11-14 DIAGNOSIS — S81.801S LEG WOUND, RIGHT, SEQUELA: ICD-10-CM

## 2017-11-14 PROCEDURE — G0463 HOSPITAL OUTPT CLINIC VISIT: HCPCS | Performed by: INTERNAL MEDICINE

## 2017-11-14 PROCEDURE — 99214 OFFICE O/P EST MOD 30 MIN: CPT | Performed by: INTERNAL MEDICINE

## 2017-11-15 ENCOUNTER — NURSE ONLY (OUTPATIENT)
Dept: WOUND CARE | Facility: HOSPITAL | Age: 82
End: 2017-11-15
Attending: CLINICAL NURSE SPECIALIST
Payer: MEDICARE

## 2017-11-15 DIAGNOSIS — L97.919 VENOUS ULCER OF RIGHT LEG (HCC): Primary | ICD-10-CM

## 2017-11-15 DIAGNOSIS — I83.019 VENOUS ULCER OF RIGHT LEG (HCC): Primary | ICD-10-CM

## 2017-11-15 PROCEDURE — 99212 OFFICE O/P EST SF 10 MIN: CPT | Performed by: CLINICAL NURSE SPECIALIST

## 2017-11-15 PROCEDURE — 29581 APPL MULTLAYER CMPRN SYS LEG: CPT

## 2017-11-15 NOTE — PROGRESS NOTES
HPI:   Diana Garcia is a 80year old female presents with the following problems. Patient has no acute complaints. She has hypertension. She is tolerating her hydrochlorothiazide well. Her blood pressure is more reasonable.   For now will not make a 11   Calcium Carbonate-Vit D-Min (CALCIUM 600 + MINERALS OR) Take 1 tablet by mouth daily. Disp:  Rfl:    acetaminophen 325 MG Oral Tab Take 325 mg by mouth every 6 (six) hours as needed for Pain.  Disp:  Rfl:       Past Medical History:   Diagnosis Date American >60 >=60   GFR, -American >60 >=60      Social History:   Smoking status: Never Smoker                                                              Smokeless tobacco: Never Used                      Alcohol use:  Yes              Comment: Oc dressings. 4. Skin lesion of cheek  Again I advised her to see dermatology. Follow-up in 3 weeks.     Nestor Goodpasture, MD  11/14/2017  6:17 PM

## 2017-11-15 NOTE — PROGRESS NOTES
Subjective    Chief Complaint  This information was obtained from the patient  The patient is new to the [address] [facility],[address] here for an initial visit for the evaluation and management of non-healing wound(s). Right lower leg wound.  She is not sure when it started Wound #6 Right, Medial Lower Leg is a Partial Thickness Venous Ulcer and has received an outcome of Resolved. Subsequent wound encounter measurements are 0cm length x 0cm width x 0cm depth, with an area of 0 sq cm and a volume of 0 cubic cm.  There is a sca Patient seen today for follow-up. Right medial lower leg wound is 100% epithelialized. There is a tiny open area noted on her anterior and lateral lower leg. No signs of infection presented. Patient was also seen by EYMI Canseco. See documentation below. Electronic Signature(s)  Signed By: Date:  Janina Rhodes 11/15/2017 12:01:20 PM     11/15/2017 11:37:58 AM Version Signed By: Date:  Kym Tovar 11/15/2017 11:38:28 AM  11/15/2017 11:08:45 AM Version Signed By: Date:  Kym Tovar 11/15/2017 11:09:07 AM

## 2017-11-22 ENCOUNTER — NURSE ONLY (OUTPATIENT)
Dept: WOUND CARE | Facility: HOSPITAL | Age: 82
End: 2017-11-22
Attending: CLINICAL NURSE SPECIALIST
Payer: MEDICARE

## 2017-11-22 DIAGNOSIS — L97.919 VENOUS ULCER OF RIGHT LEG (HCC): Primary | ICD-10-CM

## 2017-11-22 DIAGNOSIS — I83.019 VENOUS ULCER OF RIGHT LEG (HCC): Primary | ICD-10-CM

## 2017-11-22 PROCEDURE — 29581 APPL MULTLAYER CMPRN SYS LEG: CPT

## 2017-11-22 NOTE — PROGRESS NOTES
Subjective    Chief Complaint  This information was obtained from the patient  The patient was seen today for follow up and management of difficult to heal wound. Patient denies any pain or discomfort.     Allergies  no known allergies    HPI  This informat Right lower leg wound is 100% re-epithelialized. Surrounding dry skin is improved and periwound redness has subsided. Will continue with multilayer compression wrap therapy.        Procedures    A Multi Layer Compression Wrap procedure was performed for th

## 2017-11-29 ENCOUNTER — APPOINTMENT (OUTPATIENT)
Dept: WOUND CARE | Facility: HOSPITAL | Age: 82
End: 2017-11-29
Attending: CLINICAL NURSE SPECIALIST
Payer: MEDICARE

## 2017-11-29 DIAGNOSIS — I83.019 VENOUS ULCER OF RIGHT LEG (HCC): ICD-10-CM

## 2017-11-29 DIAGNOSIS — S81.801A WOUND OF RIGHT LOWER EXTREMITY, INITIAL ENCOUNTER: Primary | ICD-10-CM

## 2017-11-29 DIAGNOSIS — L97.919 VENOUS ULCER OF RIGHT LEG (HCC): ICD-10-CM

## 2017-11-29 PROCEDURE — 99212 OFFICE O/P EST SF 10 MIN: CPT | Performed by: CLINICAL NURSE SPECIALIST

## 2017-11-29 PROCEDURE — 99212 OFFICE O/P EST SF 10 MIN: CPT

## 2017-12-05 ENCOUNTER — OFFICE VISIT (OUTPATIENT)
Dept: INTERNAL MEDICINE CLINIC | Facility: CLINIC | Age: 82
End: 2017-12-05

## 2017-12-05 VITALS
DIASTOLIC BLOOD PRESSURE: 80 MMHG | WEIGHT: 128.38 LBS | OXYGEN SATURATION: 98 % | BODY MASS INDEX: 21 KG/M2 | TEMPERATURE: 98 F | HEART RATE: 97 BPM | SYSTOLIC BLOOD PRESSURE: 150 MMHG

## 2017-12-05 DIAGNOSIS — I48.91 ATRIAL FIBRILLATION, UNSPECIFIED TYPE (HCC): ICD-10-CM

## 2017-12-05 DIAGNOSIS — I10 ESSENTIAL HYPERTENSION: Primary | ICD-10-CM

## 2017-12-05 DIAGNOSIS — L98.9 SKIN LESION OF CHEEK: ICD-10-CM

## 2017-12-05 DIAGNOSIS — S81.801S LEG WOUND, RIGHT, SEQUELA: ICD-10-CM

## 2017-12-05 PROCEDURE — 99214 OFFICE O/P EST MOD 30 MIN: CPT | Performed by: INTERNAL MEDICINE

## 2017-12-05 PROCEDURE — G0463 HOSPITAL OUTPT CLINIC VISIT: HCPCS | Performed by: INTERNAL MEDICINE

## 2017-12-05 NOTE — PROGRESS NOTES
HPI:   Gilbert Son is a 80year old female presents with the following problems. Patient has no new complaints. She does not have any cardiac or pulmonary complaints. Her right lower extremity wound is almost all healed.   Posteriorly is all heale Carbonate-Vit D-Min (CALCIUM 600 + MINERALS OR) Take 1 tablet by mouth daily. Disp:  Rfl:    acetaminophen 325 MG Oral Tab Take 325 mg by mouth every 6 (six) hours as needed for Pain.  Disp:  Rfl:       Past Medical History:   Diagnosis Date   • Atrial fibr >=60   GFR, -American >60 >=60      Social History:   Smoking status: Never Smoker                                                              Smokeless tobacco: Never Used                      Alcohol use:  Yes              Comment: Occasionally, W with wound center tomorrow.     4. Skin lesion of cheek  Patient to see dermatology Friday        Follow up 1 month    Priyanka Acevedo MD  12/5/2017  11:15 AM

## 2017-12-06 ENCOUNTER — NURSE ONLY (OUTPATIENT)
Dept: WOUND CARE | Facility: HOSPITAL | Age: 82
End: 2017-12-06
Attending: CLINICAL NURSE SPECIALIST
Payer: MEDICARE

## 2017-12-06 DIAGNOSIS — L97.919 VENOUS ULCER OF RIGHT LEG (HCC): Primary | ICD-10-CM

## 2017-12-06 DIAGNOSIS — I83.019 VENOUS ULCER OF RIGHT LEG (HCC): Primary | ICD-10-CM

## 2017-12-06 PROCEDURE — 99212 OFFICE O/P EST SF 10 MIN: CPT

## 2017-12-06 PROCEDURE — 99212 OFFICE O/P EST SF 10 MIN: CPT | Performed by: CLINICAL NURSE SPECIALIST

## 2017-12-06 NOTE — PROGRESS NOTES
Subjective    Chief Complaint  This information was obtained from the patient  The patient was seen today for follow up and management of difficult to heal wound. Patient denies any pain or discomfort.  12/6/2017 pt went to see her primary doctor who took o present in room) are in agreement to plan/discharge from wound center. Plan          Patient presents to the outpatient wound clinic with right lower extremity wounds healed. Erythema noted to right ankle area patient complained of itching at times.  Dry

## 2017-12-08 ENCOUNTER — OFFICE VISIT (OUTPATIENT)
Dept: DERMATOLOGY CLINIC | Facility: CLINIC | Age: 82
End: 2017-12-08

## 2017-12-08 DIAGNOSIS — Z85.828 HISTORY OF SKIN CANCER: ICD-10-CM

## 2017-12-08 DIAGNOSIS — D48.5 NEOPLASM OF UNCERTAIN BEHAVIOR OF SKIN: Primary | ICD-10-CM

## 2017-12-08 DIAGNOSIS — L82.1 SEBORRHEIC KERATOSES: ICD-10-CM

## 2017-12-08 DIAGNOSIS — L57.0 AK (ACTINIC KERATOSIS): ICD-10-CM

## 2017-12-08 PROCEDURE — 88305 TISSUE EXAM BY PATHOLOGIST: CPT | Performed by: DERMATOLOGY

## 2017-12-08 PROCEDURE — 11100 BIOPSY OF SKIN LESION: CPT | Performed by: DERMATOLOGY

## 2017-12-08 PROCEDURE — 99202 OFFICE O/P NEW SF 15 MIN: CPT | Performed by: DERMATOLOGY

## 2017-12-12 NOTE — PROGRESS NOTES
The pathology report from last visit showed  [address] as suspected. Pt had Mohs in the past with Dr. Mert Pyle that he is in [place] now not [place]. Info given at visit to pt and her daughter.   Mohs ideal or excision with Plastics Dr. Deepti Tenorio if they

## 2017-12-14 NOTE — PROGRESS NOTES
Tried faxing again - not going through. I called Dr. Soto Bland office - fax number confirmed . They will check their fax machine as we have been receiving and faxing other reports with no issues - please try faxing this again later.

## 2017-12-14 NOTE — PROGRESS NOTES
Spoke to pt to relay bx results and [address] message. Pt states she plans to f/u with Dr. Waldemar Macias in [place] for [address]. Attempted to fax path report multiple times but did not go thru. Report & cover sheet in [address] appt box. Pls try again. Thank you.    Pt emily

## 2017-12-15 NOTE — PROGRESS NOTES
Attempted to fax path report to Dr. Shannon Gotti office x2 . Fax would not go through. Please call Dr. Shannon Gotti office tomorrow.

## 2017-12-18 NOTE — PROGRESS NOTES
Operative Report                     Shave Biopsy     Clinical diagnosis: BCC    Size of lesion: 1.5 cm    Location: Right mid cheek    Procedure: With patient in appropriate position the skin of the above was scrubbed with alcohol.   Anesthesia wa

## 2017-12-18 NOTE — PROGRESS NOTES
Tried faxing pathology report to Dr. Agnes Brown office, will not go through. Call Dr. Agnes Brown office and fax number verified.

## 2017-12-18 NOTE — PROGRESS NOTES
Path report mailed to Dr. Cheryl Mojica office - meanwhile ticket opened for the fax machine - I also called and left msg for Dr. Ohara Ply that this was done.

## 2017-12-18 NOTE — PROGRESS NOTES
Cherri Gaona is a 80year old female.     Patient presents with:  Lesion: \"Established pt\"- LOV- 9-18-13 former pt of [place]- Pt presenting today red lesion to right cheek x 1 year that forms a scab and then pt would put vasoline on it but never seems to go essential hypertension       Social History:  Smoking status: Never Smoker                                                              Smokeless tobacco: Never Used                      Alcohol use:  Yes              Comment: Occasionally, Wine, 1 glass (a SURGERY Right      Comment: Right Hip Replacement (as per NG)  No date: HYSTERECTOMY      Comment: and BSO (as per NG)    Social History  Social History   Marital status:   Spouse name: N/A    Years of education: N/A  Number of children: N/A     Occ appropriate areas of skin performed, including scalp, head, neck, face,nails, hair, external eyes, including conjunctival mucosa, eyelids, lips, external ears, back, chest, abdomen, arms, legs, palms.   Remarkable for lesions as noted   Crusted ill-defined lab]    Results From Past 48 Hours:  No results found for this or any previous visit (from the past 48 hour(s)). Meds This Visit:      Imaging Orders:  None     Referral Orders:  No orders of the defined types were placed in this encounter.         12/17

## 2018-01-09 ENCOUNTER — OFFICE VISIT (OUTPATIENT)
Dept: INTERNAL MEDICINE CLINIC | Facility: CLINIC | Age: 83
End: 2018-01-09

## 2018-01-09 ENCOUNTER — APPOINTMENT (OUTPATIENT)
Dept: LAB | Age: 83
End: 2018-01-09
Attending: INTERNAL MEDICINE
Payer: MEDICARE

## 2018-01-09 ENCOUNTER — TELEPHONE (OUTPATIENT)
Dept: INTERNAL MEDICINE CLINIC | Facility: CLINIC | Age: 83
End: 2018-01-09

## 2018-01-09 VITALS
TEMPERATURE: 98 F | SYSTOLIC BLOOD PRESSURE: 144 MMHG | OXYGEN SATURATION: 98 % | HEART RATE: 90 BPM | DIASTOLIC BLOOD PRESSURE: 78 MMHG | WEIGHT: 134 LBS | HEIGHT: 65 IN | BODY MASS INDEX: 22.33 KG/M2

## 2018-01-09 DIAGNOSIS — Z00.00 ROUTINE HEALTH MAINTENANCE: ICD-10-CM

## 2018-01-09 DIAGNOSIS — R20.2 PARESTHESIAS: ICD-10-CM

## 2018-01-09 DIAGNOSIS — L98.9 SKIN LESION OF CHEEK: ICD-10-CM

## 2018-01-09 DIAGNOSIS — S81.801S LEG WOUND, RIGHT, SEQUELA: ICD-10-CM

## 2018-01-09 DIAGNOSIS — I10 ESSENTIAL HYPERTENSION: Primary | ICD-10-CM

## 2018-01-09 DIAGNOSIS — C44.319 BASAL CELL CARCINOMA OF RIGHT CHEEK: ICD-10-CM

## 2018-01-09 DIAGNOSIS — I10 ESSENTIAL HYPERTENSION: ICD-10-CM

## 2018-01-09 DIAGNOSIS — I48.91 ATRIAL FIBRILLATION, UNSPECIFIED TYPE (HCC): ICD-10-CM

## 2018-01-09 LAB
ALBUMIN SERPL BCP-MCNC: 3.4 G/DL (ref 3.5–4.8)
ALBUMIN/GLOB SERPL: 1.2 {RATIO} (ref 1–2)
ALP SERPL-CCNC: 106 U/L (ref 32–100)
ALT SERPL-CCNC: 21 U/L (ref 14–54)
ANION GAP SERPL CALC-SCNC: 11 MMOL/L (ref 0–18)
AST SERPL-CCNC: 38 U/L (ref 15–41)
BILIRUB SERPL-MCNC: 0.4 MG/DL (ref 0.3–1.2)
BUN SERPL-MCNC: 13 MG/DL (ref 8–20)
BUN/CREAT SERPL: 16 (ref 10–20)
CALCIUM SERPL-MCNC: 8.8 MG/DL (ref 8.5–10.5)
CHLORIDE SERPL-SCNC: 96 MMOL/L (ref 95–110)
CO2 SERPL-SCNC: 27 MMOL/L (ref 22–32)
CREAT SERPL-MCNC: 0.81 MG/DL (ref 0.5–1.5)
GLOBULIN PLAS-MCNC: 2.9 G/DL (ref 2.5–3.7)
GLUCOSE SERPL-MCNC: 102 MG/DL (ref 70–99)
OSMOLALITY UR CALC.SUM OF ELEC: 278 MOSM/KG (ref 275–295)
POTASSIUM SERPL-SCNC: 4.6 MMOL/L (ref 3.3–5.1)
PROT SERPL-MCNC: 6.3 G/DL (ref 5.9–8.4)
SODIUM SERPL-SCNC: 134 MMOL/L (ref 136–144)
T4 FREE SERPL-MCNC: 0.81 NG/DL (ref 0.58–1.64)
TSH SERPL-ACNC: 5.98 UIU/ML (ref 0.45–5.33)
VIT B12 SERPL-MCNC: 284 PG/ML (ref 181–914)

## 2018-01-09 PROCEDURE — 82607 VITAMIN B-12: CPT

## 2018-01-09 PROCEDURE — 84443 ASSAY THYROID STIM HORMONE: CPT

## 2018-01-09 PROCEDURE — 84439 ASSAY OF FREE THYROXINE: CPT

## 2018-01-09 PROCEDURE — 36415 COLL VENOUS BLD VENIPUNCTURE: CPT

## 2018-01-09 PROCEDURE — 99214 OFFICE O/P EST MOD 30 MIN: CPT | Performed by: INTERNAL MEDICINE

## 2018-01-09 PROCEDURE — G0463 HOSPITAL OUTPT CLINIC VISIT: HCPCS | Performed by: INTERNAL MEDICINE

## 2018-01-09 PROCEDURE — 80053 COMPREHEN METABOLIC PANEL: CPT

## 2018-01-09 NOTE — PROGRESS NOTES
HPI:   Laura Silva is a 80year old female presents with the following problems. Patient has hypertension. On therapy. Asymptomatic. Repeat blood pressure 144/78. For now will not make any adjustments in medications.     Patient is atrial fibrillati Tab Take 1 tablet (12.5 mg total) by mouth daily.  Disp: 30 tablet Rfl: 12   lisinopril 5 MG Oral Tab Take 2 tablets every morning and 1 tablet in the PM Disp: 90 tablet Rfl: 11   apixaban 2.5 MG Oral Tab Take 1 tablet (2.5 mg total) by mouth 2 (two) times Cancer (as per NG)       Results for orders placed or performed in visit on 12/08/17  -PATHOLOGY TISSUE P   Result Value Ref Range   Case Report Surgical Pathology                                Case: [id number]                                   Authorizing in bowels  :  denies dysuria or frequency  NEURO:  denies headaches or dizziness  PSYCHE:  denies depression or anxiety      EXAM:   /78   Pulse 90   Temp 97.9 °F (36.6 °C) (Oral)   Ht 5' 5\" (1.651 m)   Wt 134 lb (60.8 kg)   SpO2 98%   BMI 22.30 k

## 2018-01-10 ENCOUNTER — TELEPHONE (OUTPATIENT)
Dept: INTERNAL MEDICINE CLINIC | Facility: CLINIC | Age: 83
End: 2018-01-10

## 2018-01-10 NOTE — TELEPHONE ENCOUNTER
Please notify patient her labs were fairly good. Vit B12 low normal. Her numbness in hands may be able to be helped with OTC Vit B12 1000 micrograms q day. Her thrroid was a very minor amount low. Nothing for that for now.

## 2018-01-17 NOTE — TELEPHONE ENCOUNTER
She comes with a daughter ( I do know name ). Maybe she will be available. If not available I can send letter.

## 2018-01-17 NOTE — TELEPHONE ENCOUNTER
3 attempt made, no answer. LMTCB    To Dr. Adriano Munroe we have been unsuccessful at reaching the patient by phone. Do you want to send a letter?

## 2018-01-18 ENCOUNTER — APPOINTMENT (OUTPATIENT)
Dept: WOUND CARE | Facility: HOSPITAL | Age: 83
End: 2018-01-18
Attending: CLINICAL NURSE SPECIALIST
Payer: MEDICARE

## 2018-01-18 NOTE — TELEPHONE ENCOUNTER
Reached out to patient again and spoke with daughter, Prieto Juárez (the same daughter who comes with patient to appts). Relayed Dr. Marie Rank message re: lab results and recommendation to start B12 supplements.  She verbalized understanding and thanks us for the ca

## 2018-01-18 NOTE — TELEPHONE ENCOUNTER
Letter mailed to patients home address. Daughters name/information not listed in [facility].

## 2018-01-19 ENCOUNTER — APPOINTMENT (OUTPATIENT)
Dept: WOUND CARE | Facility: HOSPITAL | Age: 83
End: 2018-01-19
Attending: NURSE PRACTITIONER
Payer: MEDICARE

## 2018-01-19 DIAGNOSIS — L97.919 VENOUS ULCER OF RIGHT LEG (HCC): Primary | ICD-10-CM

## 2018-01-19 DIAGNOSIS — I83.019 VENOUS ULCER OF RIGHT LEG (HCC): Primary | ICD-10-CM

## 2018-01-19 PROCEDURE — 99213 OFFICE O/P EST LOW 20 MIN: CPT | Performed by: CLINICAL NURSE SPECIALIST

## 2018-01-19 PROCEDURE — 29581 APPL MULTLAYER CMPRN SYS LEG: CPT

## 2018-01-19 PROCEDURE — 99212 OFFICE O/P EST SF 10 MIN: CPT | Performed by: CLINICAL NURSE SPECIALIST

## 2018-01-19 NOTE — PROGRESS NOTES
Subjective    Chief Complaint  This information was obtained from the patient  The patient was seen today for follow up and management of difficult to heal wound. Patient denies any pain or discomfort.  12/6/2017 pt went to see her primary doctor who took o Ear/Nose/Mouth/Throat: Other (hearing aids in bilateral ears)  Cardiovascular (Central/Peripheral): Edema (BLE)  Musculoskeletal: Assistive Devices (walker, cane)  Integumentary (Hair/Skin/Nails): Dryness (BLE dry skin), Hyperpigmentation (BLE), Open Sore Wound #9 Right, Medial Lower Leg is an acute Partial Thickness Venous Ulcer and has received a status of Not Healed. Initial wound encounter measurements are 1cm length x 1cm width x 0.1cm depth, with an area of 1 sq cm and a volume of 0.1 cubic cm.  No cinthia Right Extremity: Edema is not present  Compression Device In Use: No  Calf Measurement 30 cm from heel with right measurement of 32 cm  Ankle Measurement 10 cm from heel with right measurement of 20 cm  Foot Measurement 10 cm from heel with right measureme Wound #9 (Venous Ulcer) is located on the right, medial lower leg. A Multi Layer Compression Wrap procedure was performed for the lower right extremity by Vonzell Najjar , RN. Geri Oneal was applied with .  The procedure was tolerated well with p

## 2018-01-24 ENCOUNTER — NURSE ONLY (OUTPATIENT)
Dept: WOUND CARE | Facility: HOSPITAL | Age: 83
End: 2018-01-24
Attending: CLINICAL NURSE SPECIALIST
Payer: MEDICARE

## 2018-01-24 DIAGNOSIS — I83.019 VENOUS ULCER OF RIGHT LEG (HCC): Primary | ICD-10-CM

## 2018-01-24 DIAGNOSIS — L97.919 VENOUS ULCER OF RIGHT LEG (HCC): Primary | ICD-10-CM

## 2018-01-24 PROCEDURE — 29581 APPL MULTLAYER CMPRN SYS LEG: CPT

## 2018-01-24 PROCEDURE — 99212 OFFICE O/P EST SF 10 MIN: CPT | Performed by: CLINICAL NURSE SPECIALIST

## 2018-01-24 NOTE — PROGRESS NOTES
Subjective    Chief Complaint  This information was obtained from the patient  The patient was seen today for follow up and management of difficult to heal wound. Patient denies any pain or discomfort.  12/6/2017 pt went to see her primary doctor who took o is normal. The temperature of the periwound skin is WNL. Periwound skin does not exhibit signs or symptoms of infection. Local Pulse is Doppler.    General Notes:  1-24-18: Denuded skin to periwound area    Wound #10 Right, Medial, Anterior Lower Leg is an extremity by Carlos Gaona RN. [place] Cotton was applied with . The procedure was tolerated well with pain level of 0 throughout and a pain level of 0 following the procedure.         Plan      Follow-Up Appointments:  A follow-up appointment elise tape/stockinet/wrap. using León (1), Silk tape (1)  Compression wrapping  Stockinette applied using 3\" (1)  Compression used: using Artiflex 10 cm (1), Artiflex 15 cm (1), Comprilan 08 cm (1), Comprilan 10 cm (1)

## 2018-01-29 ENCOUNTER — NURSE ONLY (OUTPATIENT)
Dept: WOUND CARE | Facility: HOSPITAL | Age: 83
End: 2018-01-29
Attending: CLINICAL NURSE SPECIALIST
Payer: MEDICARE

## 2018-01-29 DIAGNOSIS — I83.019 VENOUS ULCER OF RIGHT LEG (HCC): Primary | ICD-10-CM

## 2018-01-29 DIAGNOSIS — L97.919 VENOUS ULCER OF RIGHT LEG (HCC): Primary | ICD-10-CM

## 2018-01-29 PROCEDURE — 29581 APPL MULTLAYER CMPRN SYS LEG: CPT

## 2018-01-29 PROCEDURE — 99212 OFFICE O/P EST SF 10 MIN: CPT | Performed by: CLINICAL NURSE SPECIALIST

## 2018-01-29 NOTE — PROGRESS NOTES
Subjective    Chief Complaint  This information was obtained from the patient  The patient was seen today for follow up and management of difficult to heal wound. Patient denies any pain or discomfort.  12/6/2017 pt went to see her primary doctor who took o Wound #9 Right, Medial Lower Leg is an acute Partial Thickness Venous Ulcer and has received a status of Not Healed.  Subsequent wound encounter measurements are 0.1cm length x 0.1cm width x 0.1cm depth, with an area of 0.01 sq cm and a volume of 0.001 cubi Wound #9 (Venous Ulcer) is located on the right, medial lower leg. A Multi Layer Compression Wrap procedure was performed for the lower right extremity by Agnes Carter RN. Conda Dante was applied with .  The procedure was tolerated well with p Wound #10 (Right, Medial, Anterior Lower Leg)  . Wound Treatment Note  Assessed patient’s pain status and effectiveness of pain management plan. Cleansed wound and periwound with non-cytotoxic agent.  using Wound Cleanser Spray (1)   Applied topical produc

## 2018-02-01 ENCOUNTER — NURSE ONLY (OUTPATIENT)
Dept: WOUND CARE | Facility: HOSPITAL | Age: 83
End: 2018-02-01
Attending: CLINICAL NURSE SPECIALIST
Payer: MEDICARE

## 2018-02-01 ENCOUNTER — TELEPHONE (OUTPATIENT)
Dept: INTERNAL MEDICINE CLINIC | Facility: CLINIC | Age: 83
End: 2018-02-01

## 2018-02-01 DIAGNOSIS — I83.019 VENOUS ULCER OF RIGHT LEG (HCC): Primary | ICD-10-CM

## 2018-02-01 DIAGNOSIS — L97.919 VENOUS ULCER OF RIGHT LEG (HCC): Primary | ICD-10-CM

## 2018-02-01 PROCEDURE — 29581 APPL MULTLAYER CMPRN SYS LEG: CPT

## 2018-02-01 PROCEDURE — 99212 OFFICE O/P EST SF 10 MIN: CPT | Performed by: CLINICAL NURSE SPECIALIST

## 2018-02-01 NOTE — PROGRESS NOTES
Subjective    Chief Complaint  This information was obtained from the patient  The patient was seen today for follow up and management of difficult to heal wound. Patient denies any pain or discomfort.  12/6/2017 pt went to see her primary doctor who took o The periwound skin texture is normal. The periwound skin moisture is normal. The periwound skin color is normal. The temperature of the periwound skin is WNL. Periwound skin does not exhibit signs or symptoms of infection.     Wound #10 Right, Medial, Ant bring daughter with at next visit for demonstration and instruction on proper application of juxta Velcro compression garments. Patient verbalized understanding of instructions.     Electronic Signature(s)  Signed By: Date:  Ladan Del Rio 02/01/2018 8:55:09

## 2018-02-01 NOTE — TELEPHONE ENCOUNTER
Patient has an appt for 2/6, but is still being treated by the wound clinic. Should she still come or reschedule after she is done with wound clinic?    (Make sure to cancel 2/6 if that is the recommendation).

## 2018-02-02 NOTE — TELEPHONE ENCOUNTER
Patient called back to say her last wound visit is 2/28. She made an appt with Dr. Josef Morris for 3/5.

## 2018-02-02 NOTE — TELEPHONE ENCOUNTER
calledpatient and relayed DR. MAHMOOD message - patient verbalized understanding.  She will call back to reschedule

## 2018-02-05 ENCOUNTER — APPOINTMENT (OUTPATIENT)
Dept: WOUND CARE | Facility: HOSPITAL | Age: 83
End: 2018-02-05
Attending: CLINICAL NURSE SPECIALIST
Payer: MEDICARE

## 2018-02-07 ENCOUNTER — NURSE ONLY (OUTPATIENT)
Dept: WOUND CARE | Facility: HOSPITAL | Age: 83
End: 2018-02-07
Attending: CLINICAL NURSE SPECIALIST
Payer: MEDICARE

## 2018-02-07 DIAGNOSIS — I83.019 VENOUS ULCER OF RIGHT LEG (HCC): Primary | ICD-10-CM

## 2018-02-07 DIAGNOSIS — L97.919 VENOUS ULCER OF RIGHT LEG (HCC): Primary | ICD-10-CM

## 2018-02-07 PROCEDURE — 29581 APPL MULTLAYER CMPRN SYS LEG: CPT

## 2018-02-07 NOTE — PROGRESS NOTES
Subjective    Chief Complaint  This information was obtained from the patient  The patient was seen today for follow up and management of difficult to heal wound. Patient denies any pain or discomfort.  12/6/2017 pt went to see her primary doctor who took o The periwound skin texture is normal. The periwound skin moisture is normal. The periwound skin color is normal. The temperature of the periwound skin is WNL. Periwound skin does not exhibit signs or symptoms of infection.         Patient presents to the wo Assessed patient’s pain status and effectiveness of pain management plan. Cleansed wound and periwound with non-cytotoxic agent. using Wound Cleanser Spray (1)   Applied Primary Wound Dressing. using Xeroform 5x9 (1)   Applied Secondary Wound Dressing.  u

## 2018-02-13 ENCOUNTER — APPOINTMENT (OUTPATIENT)
Dept: WOUND CARE | Facility: HOSPITAL | Age: 83
End: 2018-02-13
Attending: CLINICAL NURSE SPECIALIST
Payer: MEDICARE

## 2018-02-13 DIAGNOSIS — L97.919 VENOUS ULCER OF RIGHT LEG (HCC): Primary | ICD-10-CM

## 2018-02-13 DIAGNOSIS — I83.019 VENOUS ULCER OF RIGHT LEG (HCC): Primary | ICD-10-CM

## 2018-02-13 PROCEDURE — 99212 OFFICE O/P EST SF 10 MIN: CPT | Performed by: CLINICAL NURSE SPECIALIST

## 2018-02-13 PROCEDURE — 29581 APPL MULTLAYER CMPRN SYS LEG: CPT

## 2018-02-13 NOTE — PROGRESS NOTES
Subjective    Chief Complaint  This information was obtained from the patient  The patient was seen today for follow up and management of difficult to heal wound. Patient denies any pain or discomfort.  12/6/2017 pt went to see her primary doctor who took o Wound #9 Right, Medial Lower Leg is an acute Partial Thickness Venous Ulcer and has received a status of Not Healed.  Subsequent wound encounter measurements are 2.6cm length x 2.5cm width x 0.1cm depth, with an area of 6.5 sq cm and a volume of 0.65 cubic Wound #9 (Venous Ulcer) is located on the right, medial lower leg. A Multi Layer Compression Wrap procedure was performed for the lower right extremity by Allan Sanders RN. Stanley Feathers was applied with .  The procedure was tolerated well with p

## 2018-02-21 ENCOUNTER — NURSE ONLY (OUTPATIENT)
Dept: WOUND CARE | Facility: HOSPITAL | Age: 83
End: 2018-02-21
Attending: CLINICAL NURSE SPECIALIST
Payer: MEDICARE

## 2018-02-21 DIAGNOSIS — I83.019 VENOUS ULCER OF RIGHT LEG (HCC): Primary | ICD-10-CM

## 2018-02-21 DIAGNOSIS — I87.2 VENOUS (PERIPHERAL) INSUFFICIENCY: ICD-10-CM

## 2018-02-21 DIAGNOSIS — L97.919 VENOUS ULCER OF RIGHT LEG (HCC): Primary | ICD-10-CM

## 2018-02-21 PROCEDURE — 99212 OFFICE O/P EST SF 10 MIN: CPT | Performed by: CLINICAL NURSE SPECIALIST

## 2018-02-21 PROCEDURE — 29581 APPL MULTLAYER CMPRN SYS LEG: CPT

## 2018-02-21 NOTE — PROGRESS NOTES
Subjective    Chief Complaint  This information was obtained from the patient  The patient was seen today for follow up and management of difficult to heal wound. Patient denies any pain or discomfort.  12/6/2017 pt went to see her primary doctor who took o Wound #9 Right, Medial Lower Leg is an acute Partial Thickness Venous Ulcer and has received a status of Not Healed.  Subsequent wound encounter measurements are 1cm length x 0.5cm width x 0.1cm depth, with an area of 0.5 sq cm and a volume of 0.05 cubic cm Wound #9 (Venous Ulcer) is located on the right, medial lower leg. A Multi Layer Compression Wrap procedure was performed for the lower right extremity by Ricki Carson RN. Stu Crest was applied with .  The procedure was tolerated well with p Compression used: using Artiflex 10 cm (1), Artiflex 15 cm (1), Comprilan 08 cm (1), Comprilan 10 cm (1)

## 2018-02-28 ENCOUNTER — NURSE ONLY (OUTPATIENT)
Dept: WOUND CARE | Facility: HOSPITAL | Age: 83
End: 2018-02-28
Attending: CLINICAL NURSE SPECIALIST
Payer: MEDICARE

## 2018-02-28 DIAGNOSIS — I83.019 VENOUS ULCER OF RIGHT LEG (HCC): Primary | ICD-10-CM

## 2018-02-28 DIAGNOSIS — L97.919 VENOUS ULCER OF RIGHT LEG (HCC): Primary | ICD-10-CM

## 2018-02-28 PROCEDURE — 99212 OFFICE O/P EST SF 10 MIN: CPT | Performed by: CLINICAL NURSE SPECIALIST

## 2018-02-28 PROCEDURE — 29581 APPL MULTLAYER CMPRN SYS LEG: CPT

## 2018-02-28 NOTE — PROGRESS NOTES
Subjective    Chief Complaint  This information was obtained from the patient  The patient was seen today for follow up and management of difficult to heal wound. Patient denies any pain or discomfort.  12/6/2017 pt went to see her primary doctor who took o Wound #9 Right, Medial Lower Leg is an acute Partial Thickness Venous Ulcer and has received a status of Not Healed.  Subsequent wound encounter measurements are 1.1cm length x 0.7cm width x 0.1cm depth, with an area of 0.77 sq cm and a volume of 0.077 cubi A follow-up appointment should be scheduled. Patient to follow up in outpatient wound clinic for wound care/dressing changes, monitoring for s/s of infection , and monitoring for wound healing. Patient to follow up with Dr. Caitlyn Tinsley in 2 weeks.

## 2018-03-05 ENCOUNTER — OFFICE VISIT (OUTPATIENT)
Dept: INTERNAL MEDICINE CLINIC | Facility: CLINIC | Age: 83
End: 2018-03-05

## 2018-03-05 VITALS
SYSTOLIC BLOOD PRESSURE: 148 MMHG | BODY MASS INDEX: 20.66 KG/M2 | TEMPERATURE: 98 F | HEIGHT: 65 IN | HEART RATE: 95 BPM | DIASTOLIC BLOOD PRESSURE: 88 MMHG | OXYGEN SATURATION: 97 % | WEIGHT: 124 LBS

## 2018-03-05 DIAGNOSIS — M25.649 STIFFNESS OF HAND JOINT, UNSPECIFIED LATERALITY: ICD-10-CM

## 2018-03-05 DIAGNOSIS — I10 ESSENTIAL HYPERTENSION: Primary | ICD-10-CM

## 2018-03-05 DIAGNOSIS — R91.1 PULMONARY NODULE: ICD-10-CM

## 2018-03-05 DIAGNOSIS — S81.801S LEG WOUND, RIGHT, SEQUELA: ICD-10-CM

## 2018-03-05 DIAGNOSIS — I48.91 ATRIAL FIBRILLATION, UNSPECIFIED TYPE (HCC): ICD-10-CM

## 2018-03-05 DIAGNOSIS — C44.319 BASAL CELL CARCINOMA OF RIGHT CHEEK: ICD-10-CM

## 2018-03-05 DIAGNOSIS — M95.4 RIB DEFORMITY: ICD-10-CM

## 2018-03-05 DIAGNOSIS — R93.89 ABNORMAL CT OF THE CHEST: ICD-10-CM

## 2018-03-05 PROCEDURE — G0463 HOSPITAL OUTPT CLINIC VISIT: HCPCS | Performed by: INTERNAL MEDICINE

## 2018-03-05 PROCEDURE — 99215 OFFICE O/P EST HI 40 MIN: CPT | Performed by: INTERNAL MEDICINE

## 2018-03-05 NOTE — PROGRESS NOTES
HPI:   Gris Granados is a 80year old female presents with the following problems. Patient presents for follow-up. She has no acute complaints referable to the cardiac or pulmonary symptoms. She is 80years of age.     She has been diagnosed as having b between [phone] systolically. She has atrial fibrillation. On Eliquis. Tolerating well. She has not followed up with Dr. Tai Sullivan from cardiology. Did express concern regarding her right lower extremity wounds. She has a dressing in place.   She has b and 1 tablet in the PM Disp: 90 tablet Rfl: 11   apixaban 2.5 MG Oral Tab Take 1 tablet (2.5 mg total) by mouth 2 (two) times daily. Disp: 60 tablet Rfl: 11   metoprolol Tartrate 25 MG Oral Tab Take 1 tablet (25 mg total) by mouth 2 (two) times daily.  Disp Range   Glucose 102 (H) 70 - 99 mg/dL   Sodium 134 (L) 136 - 144 mmol/L   Potassium 4.6 3.3 - 5.1 mmol/L   Chloride 96 95 - 110 mmol/L   CO2 27 22 - 32 mmol/L   BUN 13 8 - 20 mg/dL   Creatinine 0.81 0.50 - 1.50 mg/dL   Calcium, Total 8.8 8.5 - 10.5 mg/dL is a nodular area right cheek that is known to be basal cell carcinoma. Patient has referral to surgeon for definitive procedure. HEENT:  Atraumatic.   pharynx without exudate or erythema  EYES:  PERRL . conjunctiva clear.   NECK:  supple, no adenopathy,

## 2018-03-08 ENCOUNTER — NURSE ONLY (OUTPATIENT)
Dept: WOUND CARE | Facility: HOSPITAL | Age: 83
End: 2018-03-08
Attending: CLINICAL NURSE SPECIALIST
Payer: MEDICARE

## 2018-03-08 DIAGNOSIS — I83.019 VENOUS ULCER OF RIGHT LEG (HCC): Primary | ICD-10-CM

## 2018-03-08 DIAGNOSIS — L97.919 VENOUS ULCER OF RIGHT LEG (HCC): Primary | ICD-10-CM

## 2018-03-08 PROCEDURE — 99212 OFFICE O/P EST SF 10 MIN: CPT

## 2018-03-14 ENCOUNTER — NURSE ONLY (OUTPATIENT)
Dept: WOUND CARE | Facility: HOSPITAL | Age: 83
End: 2018-03-14
Attending: CLINICAL NURSE SPECIALIST
Payer: MEDICARE

## 2018-03-14 DIAGNOSIS — I83.019 VENOUS ULCER OF RIGHT LEG (HCC): Primary | ICD-10-CM

## 2018-03-14 DIAGNOSIS — L97.919 VENOUS ULCER OF RIGHT LEG (HCC): Primary | ICD-10-CM

## 2018-03-14 PROCEDURE — 29581 APPL MULTLAYER CMPRN SYS LEG: CPT

## 2018-03-14 PROCEDURE — 99212 OFFICE O/P EST SF 10 MIN: CPT | Performed by: CLINICAL NURSE SPECIALIST

## 2018-03-14 NOTE — PROGRESS NOTES
Subjective    Chief Complaint  This information was obtained from the patient  The patient was seen today for follow up and management of difficult to heal wound. Patient denies any pain or discomfort.  12/6/2017 pt went to see her primary doctor who took o Wound #9 Right, Medial Lower Leg is an acute Partial Thickness Venous Ulcer and has received a status of Not Healed.  Subsequent wound encounter measurements are 5.5cm length x 2.5cm width x 0.1cm depth, with an area of 13.75 sq cm and a volume of 1.375 cub Wound #12 Right, Lateral Lower Leg is a Partial Thickness Venous Ulcer and has received a status of Not Healed. Initial wound encounter measurements are 1cm length x 1cm width x 0.1cm depth, with an area of 1 sq cm and a volume of 0.1 cubic cm.  No tunnelin Patient returns to the outpatient wound clinic with right medial ulcer larger and new leg ulcers ×2. Wounds granular. Erythema and edema noted to right lower leg. Rubor test negative. Skin within normal limits. No signs and symptoms of infection noted.  Vas Comprilan 10 cm. Wound #12 Right, Lateral Lower Leg     Wound Cleansing & Dressings  Clean wound with Normal Saline or Wound Cleanser. Acticoat  ABD pad  Cover dressing and wrap with harvey. Do not put tape directly on the skin.    Change dressing every Applied topical product to starr-wound area avoiding wound base. using Vaseline (1)   Applied Primary Wound Dressing. using Acticoat (1)   Applied Secondary Wound Dressing.  using ABD (1)   Dressing secured with non-allergenic tape/stockinet/wrap. using Kerl

## 2018-03-19 ENCOUNTER — TELEPHONE (OUTPATIENT)
Dept: INTERNAL MEDICINE CLINIC | Facility: CLINIC | Age: 83
End: 2018-03-19

## 2018-03-19 DIAGNOSIS — M79.604 PAIN AND SWELLING OF RIGHT LOWER EXTREMITY: Primary | ICD-10-CM

## 2018-03-19 DIAGNOSIS — M79.89 PAIN AND SWELLING OF RIGHT LOWER EXTREMITY: Primary | ICD-10-CM

## 2018-03-19 NOTE — TELEPHONE ENCOUNTER
Please arrange for patient to have venous ultrasound right lower extremity this Wednesday. She will be seeing Dr. Krista Wilson from dermatology at 4 PM.  I am wondering if we could schedule her to have her venous Doppler Wednesday early afternoon.   I put an ord

## 2018-03-19 NOTE — TELEPHONE ENCOUNTER
Called US , spoke with Carlos Pinto who gave toño for 3/21 at 3pm for venous doppler right extremity.

## 2018-03-19 NOTE — TELEPHONE ENCOUNTER
Got verbal ok from patient to speak to daughter Lea Ghosh - relayed message that patient is scheduled for US venous Doppler on Wednesday 3/21 at 3pm , then has toño at 4 pm with DR. Mello Ivan - daughter verbalized understanding

## 2018-03-20 NOTE — TELEPHONE ENCOUNTER
Noted. Thank you. I will be away from the office on that day but you are more than welcome to call me on my cell phone with anything you would like to  relay to me to make it easier for you.   My cell phone number is [phone]

## 2018-03-20 NOTE — TELEPHONE ENCOUNTER
Carla Rose, we'll see if there is anything we can add or tweak. It looks like they are mostly using an absorptive dressing and compression. I will keep you posted.  Kay Bradley

## 2018-03-21 ENCOUNTER — APPOINTMENT (OUTPATIENT)
Dept: WOUND CARE | Facility: HOSPITAL | Age: 83
End: 2018-03-21
Payer: MEDICARE

## 2018-03-21 ENCOUNTER — TELEPHONE (OUTPATIENT)
Dept: INTERNAL MEDICINE CLINIC | Facility: CLINIC | Age: 83
End: 2018-03-21

## 2018-03-21 ENCOUNTER — HOSPITAL ENCOUNTER (OUTPATIENT)
Dept: ULTRASOUND IMAGING | Facility: HOSPITAL | Age: 83
Discharge: HOME | End: 2018-03-21
Attending: INTERNAL MEDICINE | Admitting: DERMATOLOGY
Payer: MEDICARE

## 2018-03-21 ENCOUNTER — OFFICE VISIT (OUTPATIENT)
Dept: DERMATOLOGY CLINIC | Facility: CLINIC | Age: 83
End: 2018-03-21

## 2018-03-21 DIAGNOSIS — I83.019 VENOUS ULCER OF RIGHT LEG (HCC): ICD-10-CM

## 2018-03-21 DIAGNOSIS — L82.1 SEBORRHEIC KERATOSES: ICD-10-CM

## 2018-03-21 DIAGNOSIS — L97.919 VENOUS ULCER OF RIGHT LEG (HCC): ICD-10-CM

## 2018-03-21 DIAGNOSIS — R60.0 LEG EDEMA, RIGHT: ICD-10-CM

## 2018-03-21 DIAGNOSIS — Z85.828 HISTORY OF SKIN CANCER: ICD-10-CM

## 2018-03-21 DIAGNOSIS — C44.319 BASAL CELL CARCINOMA (BCC) OF RIGHT CHEEK: Primary | ICD-10-CM

## 2018-03-21 DIAGNOSIS — M79.89 PAIN AND SWELLING OF RIGHT LOWER EXTREMITY: ICD-10-CM

## 2018-03-21 DIAGNOSIS — M79.604 PAIN AND SWELLING OF RIGHT LOWER EXTREMITY: ICD-10-CM

## 2018-03-21 DIAGNOSIS — L57.0 AK (ACTINIC KERATOSIS): ICD-10-CM

## 2018-03-21 PROCEDURE — 99214 OFFICE O/P EST MOD 30 MIN: CPT | Performed by: DERMATOLOGY

## 2018-03-21 PROCEDURE — 93971 EXTREMITY STUDY: CPT | Performed by: INTERNAL MEDICINE

## 2018-03-21 PROCEDURE — G0463 HOSPITAL OUTPT CLINIC VISIT: HCPCS | Performed by: DERMATOLOGY

## 2018-03-21 RX ORDER — IMIQUIMOD 12.5 MG/.25G
CREAM TOPICAL
Qty: 12 EACH | Refills: 1 | Status: ON HOLD | OUTPATIENT
Start: 2018-03-21 | End: 2018-06-26 | Stop reason: ALTCHOICE

## 2018-03-21 NOTE — PATIENT INSTRUCTIONS
Use aldara every other day to right cheek x 6 weeks    Tac to ankle/part of leg red and scaly with no ulcer

## 2018-03-22 ENCOUNTER — TELEPHONE (OUTPATIENT)
Dept: DERMATOLOGY CLINIC | Facility: CLINIC | Age: 83
End: 2018-03-22

## 2018-03-22 ENCOUNTER — NURSE ONLY (OUTPATIENT)
Dept: WOUND CARE | Facility: HOSPITAL | Age: 83
End: 2018-03-22
Attending: CLINICAL NURSE SPECIALIST
Payer: MEDICARE

## 2018-03-22 DIAGNOSIS — L97.919 VENOUS ULCER OF RIGHT LEG (HCC): Primary | ICD-10-CM

## 2018-03-22 DIAGNOSIS — I83.019 VENOUS ULCER OF RIGHT LEG (HCC): Primary | ICD-10-CM

## 2018-03-22 PROCEDURE — 99212 OFFICE O/P EST SF 10 MIN: CPT | Performed by: CLINICAL NURSE SPECIALIST

## 2018-03-22 PROCEDURE — 29581 APPL MULTLAYER CMPRN SYS LEG: CPT

## 2018-03-22 NOTE — PROGRESS NOTES
Subjective    Chief Complaint  This information was obtained from the patient  The patient was seen today for follow up and management of difficult to heal wound. Patient denies any pain or discomfort.  12/6/2017 pt went to see her primary doctor who took o Wound #9 Right, Medial Lower Leg is an acute Partial Thickness Venous Ulcer and has received a status of Not Healed. There is a scant amount of sanguineous drainage noted which has no odor. The patient reports a wound pain of level 0/10.  The wound margin i (Encounter Diagnosis) I87.2 - Venous insufficiency (chronic) (peripheral)  S81.801A - Unspecified open wound, right lower leg, initial encounter  (Encounter Diagnosis) S81.801S - Unspecified open wound, right lower leg, sequela        Patient's RLE wounds Dressing secured with non-allergenic tape/stockinet/wrap. using Kerlix (1), Silk tape (1)   Compression wrapping  Stockinette applied using 3\" (1)   Compression applied to: using Toe bases to tibial tubercle (1)   Compression used: using Artiflex 10 cm (1

## 2018-03-22 NOTE — PROGRESS NOTES
She still has not had her BCC on the right cheek treated. I started her on Aldara to the base of this. It does not appear that she is really going to schedule Mohs.   She just feels overwhelmed with the wound clinic visits; she really does not want surger

## 2018-03-22 NOTE — TELEPHONE ENCOUNTER
Imani, [address] M. Erica, requesting to speak to [facility]. Need to coordinate care and notes from yesterday's o/v not in Epic. Please call [phone].

## 2018-03-22 NOTE — TELEPHONE ENCOUNTER
Ric Maldonado was in yesterday. She still has not had her BCC on the right cheek treated. I started her on Aldara to the base of this. It does not appear that she is really going to schedule Mohs.   She just feels overwhelmed with the wound clinic visi

## 2018-03-28 ENCOUNTER — NURSE ONLY (OUTPATIENT)
Dept: WOUND CARE | Facility: HOSPITAL | Age: 83
End: 2018-03-28
Attending: CLINICAL NURSE SPECIALIST
Payer: MEDICARE

## 2018-03-28 DIAGNOSIS — I83.019 VENOUS ULCER OF RIGHT LEG (HCC): Primary | ICD-10-CM

## 2018-03-28 DIAGNOSIS — L97.919 VENOUS ULCER OF RIGHT LEG (HCC): Primary | ICD-10-CM

## 2018-03-28 PROCEDURE — 29581 APPL MULTLAYER CMPRN SYS LEG: CPT

## 2018-03-28 PROCEDURE — 99212 OFFICE O/P EST SF 10 MIN: CPT | Performed by: CLINICAL NURSE SPECIALIST

## 2018-03-28 NOTE — PROGRESS NOTES
Subjective    Chief Complaint  This information was obtained from the patient  The patient was seen today for follow up and management of difficult to heal wound. Patient is concerned that she keeps opening wounds.     Allergies  no known allergies    HPI The periwound skin texture is normal. The periwound skin moisture is normal. The periwound skin color is normal. The temperature of the periwound skin is WNL. Periwound skin does not exhibit signs or symptoms of infection.     Wound #11 Right, Posterior Low (Encounter Diagnosis) I87.2 - Venous insufficiency (chronic) (peripheral)  S81.801A - Unspecified open wound, right lower leg, initial encounter  (Encounter Diagnosis) S81.801S - Unspecified open wound, right lower leg, sequela        Patient's right media Applied topical product to starr-wound area avoiding wound base. using Betamethasone valerate 0.1% cream (1), Vaseline (1)  Applied Primary Wound Dressing. using Acticoat (1)  Applied Secondary Wound Dressing.  using ABD (1)  Dressing secured with non-allerg

## 2018-04-01 NOTE — PROGRESS NOTES
Elmarie Schlatter is a 80year old female. Patient presents with:  Lesion: LOV: 12/08/17. Pt presents with wound on right lower leg, wants assessment. Pt has personal hx of BCC and AKs, family hx of sc in brother, unkwown type.              Patient has no k site     (as per NG)   • Osteoporosis screening 02/23/2009    Dexascan (as per NG)   • Other and unspecified hyperlipidemia     (as per NG)   • Unspecified essential hypertension       Social History:  Smoking status: Never Smoker disease    • Hiatal hernia     (as per NG)   • High blood pressure    • Lipid screening 04/03/2012    (as per NG)   • Osteoarthrosis, unspecified whether generalized or localized, unspecified site     (as per NG)   • Osteoporosis screening 02/23/2009    Ruben Denis treated. Still crusted. Numerous areas of crusting and scaling. Patient with ulcer right leg since the end of the summer has been seeing wound clinic for this. Treated with dressings, compression.   Patient had is almost healed and then has had new lesi planning on having this excised anytime soon. Will begin Aldara once to twice weekly. Extensive background actinic keratoses this may be helpful as well. May have more crusting and irritation.   Discussed application structures the patient and her daught encounter. Results From Past 48 Hours:  No results found for this or any previous visit (from the past 48 hour(s)). Meds This Visit:      Imaging Orders:  None     Referral Orders:  No orders of the defined types were placed in this encounter.

## 2018-04-04 ENCOUNTER — NURSE ONLY (OUTPATIENT)
Dept: WOUND CARE | Facility: HOSPITAL | Age: 83
End: 2018-04-04
Attending: CLINICAL NURSE SPECIALIST
Payer: MEDICARE

## 2018-04-04 DIAGNOSIS — I87.2 VENOUS (PERIPHERAL) INSUFFICIENCY: Primary | ICD-10-CM

## 2018-04-04 PROCEDURE — 29581 APPL MULTLAYER CMPRN SYS LEG: CPT

## 2018-04-04 PROCEDURE — 99211 OFF/OP EST MAY X REQ PHY/QHP: CPT | Performed by: CLINICAL NURSE SPECIALIST

## 2018-04-04 NOTE — PROGRESS NOTES
Subjective    Chief Complaint  This information was obtained from the patient  The patient was seen today for follow up and management of difficult to heal wound. Patient is concerned that she keeps opening wounds.     Allergies  no known allergies    HPI The periwound skin moisture is normal. The periwound skin color is normal. The periwound skin exhibited: Excoriation. The temperature of the periwound skin is WNL. Periwound skin does not exhibit signs or symptoms of infection.     Lower Extremity Assessmen Plan      Follow-Up Appointments:  A follow-up appointment should be scheduled. Patient to continue to follow up in outpatient wound clinic for wound care/dressing changes.    Plan:  to transition into Juxta compression garments when RLE wounds h

## 2018-04-05 ENCOUNTER — TELEPHONE (OUTPATIENT)
Dept: INTERNAL MEDICINE CLINIC | Facility: CLINIC | Age: 83
End: 2018-04-05

## 2018-04-05 ENCOUNTER — APPOINTMENT (OUTPATIENT)
Dept: LAB | Age: 83
End: 2018-04-05
Attending: INTERNAL MEDICINE
Payer: MEDICARE

## 2018-04-05 ENCOUNTER — OFFICE VISIT (OUTPATIENT)
Dept: INTERNAL MEDICINE CLINIC | Facility: CLINIC | Age: 83
End: 2018-04-05

## 2018-04-05 VITALS
HEIGHT: 65 IN | WEIGHT: 128.38 LBS | TEMPERATURE: 98 F | DIASTOLIC BLOOD PRESSURE: 84 MMHG | HEART RATE: 85 BPM | SYSTOLIC BLOOD PRESSURE: 148 MMHG | OXYGEN SATURATION: 98 % | BODY MASS INDEX: 21.39 KG/M2

## 2018-04-05 DIAGNOSIS — I10 ESSENTIAL HYPERTENSION: Primary | ICD-10-CM

## 2018-04-05 DIAGNOSIS — R20.0 HAND NUMBNESS: ICD-10-CM

## 2018-04-05 DIAGNOSIS — R93.89 ABNORMAL CT OF THE CHEST: ICD-10-CM

## 2018-04-05 DIAGNOSIS — R79.89 ELEVATED TSH: ICD-10-CM

## 2018-04-05 DIAGNOSIS — I48.91 ATRIAL FIBRILLATION, UNSPECIFIED TYPE (HCC): ICD-10-CM

## 2018-04-05 DIAGNOSIS — R91.1 PULMONARY NODULE: ICD-10-CM

## 2018-04-05 DIAGNOSIS — C44.319 BASAL CELL CARCINOMA OF RIGHT CHEEK: ICD-10-CM

## 2018-04-05 PROCEDURE — 36415 COLL VENOUS BLD VENIPUNCTURE: CPT

## 2018-04-05 PROCEDURE — 84439 ASSAY OF FREE THYROXINE: CPT

## 2018-04-05 PROCEDURE — 80053 COMPREHEN METABOLIC PANEL: CPT

## 2018-04-05 PROCEDURE — 84443 ASSAY THYROID STIM HORMONE: CPT

## 2018-04-05 PROCEDURE — 99214 OFFICE O/P EST MOD 30 MIN: CPT | Performed by: INTERNAL MEDICINE

## 2018-04-05 PROCEDURE — G0463 HOSPITAL OUTPT CLINIC VISIT: HCPCS | Performed by: INTERNAL MEDICINE

## 2018-04-05 NOTE — TELEPHONE ENCOUNTER
Ric Simons. I saw our mutual patient with today. Her right cheek lesion seems a little bit more irritated. I have urged her to call  who you have recommended her to see for excision of this area since it is a basal cell carcinoma.   She is us

## 2018-04-05 NOTE — TELEPHONE ENCOUNTER
Hi Dr. Lindsay Rene, Irritation would be normal with the imiquimod. . We did ask her to start less frequently than the usual rx directions. Mohs would be still a good plan.   Dr. Yeison Lee was on maternity leave, but is now back; so they could schedule an appointmen

## 2018-04-05 NOTE — PROGRESS NOTES
HPI:   Leyda Herrera is a 80year old female presents with the following problems. Patient presents for follow-up. She has no physical complaints. Her right cheek basal cell carcinoma looks a little bit more irritated.   There is a 2 cm area of irrita adequately controlled. I do not want to cause any hypotension. Also patient has atrial fibrillation. She is tolerating Eliquis. We will follow-up with me in 1 month. Continue with wound clinic evaluations weekly.     Wt Readings from Last 3 [facility])     Right Femur fractured (as per NG)   • Heart disease    • Hiatal hernia     (as per NG)   • High blood pressure    • Lipid screening 04/03/2012    (as per NG)   • Osteoarthrosis, unspecified whether generalized or localized, unspecified site     ( History:   Smoking status: Never Smoker                                                              Smokeless tobacco: Never Used                      Alcohol use: Yes           0.6 oz/week     Glasses of wine: 1 per week         REVIEW OF SYSTEMS:   GENE cold.  However no signs of acute ischemia. No cyanosis. Radial pulse excellent at 1+ bilaterally. NEURO:  Awake and aware. Motor power bilaterally. ASSESSMENT AND PLAN:         1.  Essential hypertension  Blood pressure under satisfactory control for

## 2018-04-06 ENCOUNTER — TELEPHONE (OUTPATIENT)
Dept: DERMATOLOGY CLINIC | Facility: CLINIC | Age: 83
End: 2018-04-06

## 2018-04-06 NOTE — TELEPHONE ENCOUNTER
Pt had appt with pcp he was concerned about irritation from aldara. Pt had planned mohs with Dr. Effie Gotti for this lesion last year. Please contact pt about meds/ surgery.

## 2018-04-09 NOTE — TELEPHONE ENCOUNTER
s/w pt - she was applying aldara to right cheek 5 times per week but now decreased to 3 x week per PCP's guidelines. States area is red and crusty. Denies pain, inflammation, no s/s of infection. She doesn't remember anything about Dr. Sulaiman Peters. Aidee Prabhakar \"I don't re

## 2018-04-10 ENCOUNTER — TELEPHONE (OUTPATIENT)
Dept: INTERNAL MEDICINE CLINIC | Facility: CLINIC | Age: 83
End: 2018-04-10

## 2018-04-10 DIAGNOSIS — R74.8 ELEVATED LIVER ENZYMES: Primary | ICD-10-CM

## 2018-04-10 NOTE — TELEPHONE ENCOUNTER
Dr. Sarkar Ko kindly clarify \"may decrease to Q week\" did you mean \"may decrease to every other week\"?  Thanks

## 2018-04-10 NOTE — TELEPHONE ENCOUNTER
2-3 x per week with the aldara and may decrease to q week if still very irritated. If she is not certain about surgery , then I would  Try the aldara for a while first. Things will get worse before better with this.

## 2018-04-10 NOTE — TELEPHONE ENCOUNTER
Please notify patient that her recent labs showed that her liver enzymes are increased. I am not quite sure why. For safety sake she should get a liver ultrasound. That can show some picture of her liver to make sure we do not see any abnormalities.   I

## 2018-04-11 ENCOUNTER — NURSE ONLY (OUTPATIENT)
Dept: WOUND CARE | Facility: HOSPITAL | Age: 83
End: 2018-04-11
Attending: CLINICAL NURSE SPECIALIST
Payer: MEDICARE

## 2018-04-11 DIAGNOSIS — I87.2 VENOUS (PERIPHERAL) INSUFFICIENCY: Primary | ICD-10-CM

## 2018-04-11 PROCEDURE — 29581 APPL MULTLAYER CMPRN SYS LEG: CPT

## 2018-04-11 PROCEDURE — 99212 OFFICE O/P EST SF 10 MIN: CPT | Performed by: CLINICAL NURSE SPECIALIST

## 2018-04-11 NOTE — PROGRESS NOTES
Subjective    Chief Complaint  This information was obtained from the patient  The patient was seen today for follow up and management of difficult to heal wound. Patient is concerned that she keeps opening wounds.   4/11/ 18 patient complaint a little itch Wound #11 Right, Posterior Lower Leg is a Partial Thickness Venous Ulcer and has received a status of Not Healed. Subsequent wound encounter measurements are 1.5cm length x 3cm width x 0.1cm depth, with an area of 4.5 sq cm and a volume of 0.45 cubic cm.  Lang Muse Wound #11 (Venous Ulcer) is located on the right, posterior lower leg. A Multi Layer Compression Wrap procedure was performed for the lower right extremity by Ren Rodriges RN. Mata Holley was applied with .  The procedure was tolerated well wi

## 2018-04-16 ENCOUNTER — TELEPHONE (OUTPATIENT)
Dept: INTERNAL MEDICINE CLINIC | Facility: CLINIC | Age: 83
End: 2018-04-16

## 2018-04-16 NOTE — TELEPHONE ENCOUNTER
calledpatient and relayed DR. MAHMOOD message - will mail order for liver US to patient with number to schedule.  Patient has a lot going on and RN cannot schedule since not sure when patient is available

## 2018-04-16 NOTE — TELEPHONE ENCOUNTER
Ric Rajput. I spoke to patient yesterday regarding some of her medical issues. She had an appointment with Elliot Rothman but then canceled it. She was under the impression that continuing the Aldara was all she had to do.   I told her she should re

## 2018-04-16 NOTE — TELEPHONE ENCOUNTER
Nursing, I was able to get a hold of patient yesterday on the phone and let her know that I want her to have a liver ultrasound.   At the time of the telephone call I told her we would mail her the liver ultrasound order and she could schedule it but I am w

## 2018-04-17 ENCOUNTER — TELEPHONE (OUTPATIENT)
Dept: DERMATOLOGY CLINIC | Facility: CLINIC | Age: 83
End: 2018-04-17

## 2018-04-17 NOTE — TELEPHONE ENCOUNTER
Dr. Meli Barahona concerned about bcc-pt using aldara. [phone] Leatha Cardona if she would like to con't trial  of topicals for now at least 12-16 weeks of meds needed.

## 2018-04-17 NOTE — TELEPHONE ENCOUNTER
Hi Dr. Travon Winston,  The aldara might be adequate or reducte the size of the bcc,  The aldara course is typically 12-16weeks and may require breaks in between. It is ok if she is willing to continue this as long as she is not having any major side effects.   Cr

## 2018-04-17 NOTE — TELEPHONE ENCOUNTER
Please call patient and let her know that I did message Dr. Tierra Cao about her right cheek skin cancer. For now she does not need to see Dr. Antnoio Cheng who I suggested she see for possible surgery on her skin cancer.   Dr. Tierra Cao feels she can hold off on seeing

## 2018-04-17 NOTE — TELEPHONE ENCOUNTER
s/w pt. She states she is \"doing very well\", denies any discomfort, pain with the imiquimod. Will continue to use as directed and states she will call back to schedule a F/U with KMT as advised.

## 2018-04-18 ENCOUNTER — TELEPHONE (OUTPATIENT)
Dept: INTERNAL MEDICINE CLINIC | Facility: CLINIC | Age: 83
End: 2018-04-18

## 2018-04-18 ENCOUNTER — HOSPITAL ENCOUNTER (OUTPATIENT)
Dept: ULTRASOUND IMAGING | Facility: HOSPITAL | Age: 83
Discharge: HOME | End: 2018-04-18
Attending: INTERNAL MEDICINE
Payer: MEDICARE

## 2018-04-18 ENCOUNTER — NURSE ONLY (OUTPATIENT)
Dept: WOUND CARE | Facility: HOSPITAL | Age: 83
End: 2018-04-18
Attending: CLINICAL NURSE SPECIALIST
Payer: MEDICARE

## 2018-04-18 DIAGNOSIS — I87.2 VENOUS (PERIPHERAL) INSUFFICIENCY: Primary | ICD-10-CM

## 2018-04-18 DIAGNOSIS — R74.8 ELEVATED LIVER ENZYMES: ICD-10-CM

## 2018-04-18 DIAGNOSIS — I83.019 VENOUS ULCER OF RIGHT LEG (HCC): ICD-10-CM

## 2018-04-18 DIAGNOSIS — L97.919 VENOUS ULCER OF RIGHT LEG (HCC): ICD-10-CM

## 2018-04-18 DIAGNOSIS — R74.8 ELEVATED LIVER ENZYMES: Primary | ICD-10-CM

## 2018-04-18 PROCEDURE — 76705 ECHO EXAM OF ABDOMEN: CPT | Performed by: INTERNAL MEDICINE

## 2018-04-18 PROCEDURE — 99212 OFFICE O/P EST SF 10 MIN: CPT | Performed by: CLINICAL NURSE SPECIALIST

## 2018-04-18 PROCEDURE — 29581 APPL MULTLAYER CMPRN SYS LEG: CPT

## 2018-04-18 NOTE — TELEPHONE ENCOUNTER
Elizabeth Reese called back - stating DR. MAHMOOD called her on Sunday regarding labs -no orders , no phone call in system? - please advise - to DR. MAHMOOD

## 2018-04-18 NOTE — TELEPHONE ENCOUNTER
Brenden Calderon called - patient there , no lab orders , per DR. MAHMOOD will repeat enzymes when he sees patient in 1 month - Brenden Calderon will relay message to patient

## 2018-04-18 NOTE — TELEPHONE ENCOUNTER
DR. Mikel Bowen called stating he put in lab orders - called Juan David Lewis who said patient had left already.

## 2018-04-18 NOTE — PROGRESS NOTES
Subjective    Chief Complaint  This information was obtained from the patient  The patient was seen today for follow up and management of difficult to heal wound. Patient is concerned that she keeps opening wounds.   4/11/ 18 patient complaint a little itch Wound #11 Right, Posterior Lower Leg is a Partial Thickness Venous Ulcer and has received a status of Not Healed. Subsequent wound encounter measurements are 0.5cm length x 0.8cm width x 0.1cm depth, with an area of 0.4 sq cm and a volume of 0.04 cubic cm. Wound #11 (Venous Ulcer) is located on the right, posterior lower leg. A Multi Layer Compression Wrap procedure was performed for the lower right extremity by Marlee Hodgkins , RN. Eagle Brown was applied with .  The procedure was tolerated well wi Compression used: using Artiflex 10 cm (1), Artiflex 15 cm (1), Comprilan 06 cm (1), Comprilan 08 cm (1)

## 2018-04-19 ENCOUNTER — TELEPHONE (OUTPATIENT)
Dept: INTERNAL MEDICINE CLINIC | Facility: CLINIC | Age: 83
End: 2018-04-19

## 2018-04-19 NOTE — TELEPHONE ENCOUNTER
Please let patient know that her liver ultrasound came out good. I apologize for neglecting to put in her lab orders. I did put in lab orders now.     Maybe if she is out and about over the next few days she can go for nonfasting labs or if she is not abl

## 2018-04-25 ENCOUNTER — APPOINTMENT (OUTPATIENT)
Dept: WOUND CARE | Facility: HOSPITAL | Age: 83
End: 2018-04-25
Payer: MEDICARE

## 2018-04-26 ENCOUNTER — NURSE ONLY (OUTPATIENT)
Dept: WOUND CARE | Facility: HOSPITAL | Age: 83
End: 2018-04-26
Attending: CLINICAL NURSE SPECIALIST
Payer: MEDICARE

## 2018-04-26 DIAGNOSIS — L97.919 VENOUS ULCER OF RIGHT LEG (HCC): ICD-10-CM

## 2018-04-26 DIAGNOSIS — I83.019 VENOUS ULCER OF RIGHT LEG (HCC): ICD-10-CM

## 2018-04-26 DIAGNOSIS — I87.2 VENOUS (PERIPHERAL) INSUFFICIENCY: Primary | ICD-10-CM

## 2018-04-26 PROCEDURE — 29581 APPL MULTLAYER CMPRN SYS LEG: CPT

## 2018-04-26 PROCEDURE — 99212 OFFICE O/P EST SF 10 MIN: CPT | Performed by: CLINICAL NURSE SPECIALIST

## 2018-04-26 NOTE — PROGRESS NOTES
Subjective    Chief Complaint  This information was obtained from the patient  The patient was seen today for follow up and management of difficult to heal wound. 4/26/18 no complaints for today    Allergies  no known allergies    HPI  This information was The periwound skin exhibited: Dry/Scaly, Hemosiderosis.  The periwound skin did not exhibit: Brawny Induration, Edema, Excoriation, Induration, Callus, Crepitus, Fluctuance, Friable, Rash, Moist, Atrophie [place], Cyanosis, Ecchymosis, Erythema, Pallor, Rub Clean wound with Normal Saline or Wound Cleanser. Thick foam  Cover dressing and wrap with harvey. Do not put tape directly on the skin.    Change dressing every: - week    Compression Therapy:  Chito 3\"  Artiflex 10 cm  Artiflex 15 cm  Comprilan 8 c

## 2018-05-01 ENCOUNTER — TELEPHONE (OUTPATIENT)
Dept: INTERNAL MEDICINE CLINIC | Facility: CLINIC | Age: 83
End: 2018-05-01

## 2018-05-01 NOTE — TELEPHONE ENCOUNTER
Called patient and relayed Dr Elizabeth Santiago message to her. She verbalized understanding. She was planning to get them done nonfasting tomorrow.
Please remind patient to go for her labs when she goes to the wound center tomorrow. There is an order for CBC and CMP.
clear

## 2018-05-02 ENCOUNTER — LAB ENCOUNTER (OUTPATIENT)
Dept: LAB | Facility: HOSPITAL | Age: 83
End: 2018-05-02
Attending: INTERNAL MEDICINE
Payer: MEDICARE

## 2018-05-02 ENCOUNTER — OFFICE VISIT (OUTPATIENT)
Dept: DERMATOLOGY CLINIC | Facility: CLINIC | Age: 83
End: 2018-05-02

## 2018-05-02 ENCOUNTER — NURSE ONLY (OUTPATIENT)
Dept: WOUND CARE | Facility: HOSPITAL | Age: 83
End: 2018-05-02
Attending: CLINICAL NURSE SPECIALIST
Payer: MEDICARE

## 2018-05-02 DIAGNOSIS — L97.919 VENOUS ULCER OF RIGHT LEG (HCC): ICD-10-CM

## 2018-05-02 DIAGNOSIS — C44.319 BASAL CELL CARCINOMA (BCC) OF RIGHT CHEEK: Primary | ICD-10-CM

## 2018-05-02 DIAGNOSIS — Z85.828 HISTORY OF SKIN CANCER: ICD-10-CM

## 2018-05-02 DIAGNOSIS — L82.1 SEBORRHEIC KERATOSES: ICD-10-CM

## 2018-05-02 DIAGNOSIS — L57.0 AK (ACTINIC KERATOSIS): ICD-10-CM

## 2018-05-02 DIAGNOSIS — I83.019 VENOUS ULCER OF RIGHT LEG (HCC): ICD-10-CM

## 2018-05-02 DIAGNOSIS — R74.8 ELEVATED LIVER ENZYMES: ICD-10-CM

## 2018-05-02 DIAGNOSIS — I87.2 VENOUS (PERIPHERAL) INSUFFICIENCY: Primary | ICD-10-CM

## 2018-05-02 PROCEDURE — 36415 COLL VENOUS BLD VENIPUNCTURE: CPT

## 2018-05-02 PROCEDURE — G0463 HOSPITAL OUTPT CLINIC VISIT: HCPCS | Performed by: DERMATOLOGY

## 2018-05-02 PROCEDURE — 80053 COMPREHEN METABOLIC PANEL: CPT

## 2018-05-02 PROCEDURE — 29581 APPL MULTLAYER CMPRN SYS LEG: CPT

## 2018-05-02 PROCEDURE — 85025 COMPLETE CBC W/AUTO DIFF WBC: CPT

## 2018-05-02 PROCEDURE — 99213 OFFICE O/P EST LOW 20 MIN: CPT | Performed by: DERMATOLOGY

## 2018-05-02 PROCEDURE — 99212 OFFICE O/P EST SF 10 MIN: CPT | Performed by: CLINICAL NURSE SPECIALIST

## 2018-05-02 NOTE — PROGRESS NOTES
Subjective    Chief Complaint  This information was obtained from the patient  The patient was seen today for follow up and management of difficult to heal wound. 5/2/18 no complaints for today    Allergies  no known allergies    HPI  This information was o The periwound skin exhibited: Dry/Scaly, Hemosiderosis.  The periwound skin did not exhibit: Brawny Induration, Edema, Excoriation, Induration, Callus, Crepitus, Fluctuance, Friable, Rash, Moist, Maceration, Atrophie Lakesha, Cyanosis, Ecchymosis, Erythema, Cover dressing and wrap with harvey. Do not put tape directly on the skin. Change dressing every: - week    Compression Therapy:  Ana [place]ma[place] 3\"  Artiflex 10 cm  Artiflex 15 cm  Comprilan 8 cm. Comprilan 10 cm.     Follow-Up Appointments:  A follow-up a

## 2018-05-02 NOTE — PATIENT INSTRUCTIONS
Please stop the imiquimod for the next 3 weeks. Then may resume 2x a week.       For now use vaseline 2 times a day for now

## 2018-05-09 ENCOUNTER — NURSE ONLY (OUTPATIENT)
Dept: WOUND CARE | Facility: HOSPITAL | Age: 83
End: 2018-05-09
Attending: CLINICAL NURSE SPECIALIST
Payer: MEDICARE

## 2018-05-09 DIAGNOSIS — I87.2 VENOUS (PERIPHERAL) INSUFFICIENCY: Primary | ICD-10-CM

## 2018-05-09 PROCEDURE — 29581 APPL MULTLAYER CMPRN SYS LEG: CPT

## 2018-05-09 PROCEDURE — 99212 OFFICE O/P EST SF 10 MIN: CPT | Performed by: CLINICAL NURSE SPECIALIST

## 2018-05-09 NOTE — PROGRESS NOTES
Subjective    Chief Complaint  This information was obtained from the patient  The patient was seen today for follow up and management of difficult to heal wound. 5/2/18 no complaints for today  5/9/18 patient complaint a pain in her right lower leg    Kumar Wound #13 Right, Lateral, Posterior Lower Leg is a chronic Partial Thickness Venous Ulcer and has received a status of Not Healed.  Subsequent wound encounter measurements are 1.6cm length x 0.5cm width x 0.1cm depth, with an area of 0.8 sq cm and a volume Multiple scattered partial-thickness granular wounds noted to Right posterior calf area. Patient denies bumping or rubbing against furniture.  Wound care dressing changed: Actocoat dressing to decrease bioburden to wound, grey foam to add to posterior calf Dressing secured with non-allergenic tape/stockinet/wrap. using León (1), Silk tape (1)   Applied Compression device.  using Comprilan (1)   Compression wrapping  Limb cleansed using Soap and water (1)   Stockinette applied using 3\" (1)   Compression appl

## 2018-05-10 ENCOUNTER — OFFICE VISIT (OUTPATIENT)
Dept: INTERNAL MEDICINE CLINIC | Facility: CLINIC | Age: 83
End: 2018-05-10

## 2018-05-10 VITALS
DIASTOLIC BLOOD PRESSURE: 88 MMHG | TEMPERATURE: 97 F | BODY MASS INDEX: 20.99 KG/M2 | HEIGHT: 65 IN | SYSTOLIC BLOOD PRESSURE: 144 MMHG | HEART RATE: 88 BPM | OXYGEN SATURATION: 98 % | WEIGHT: 126 LBS

## 2018-05-10 DIAGNOSIS — R74.8 ELEVATED LIVER ENZYMES: ICD-10-CM

## 2018-05-10 DIAGNOSIS — C44.319 BASAL CELL CARCINOMA OF RIGHT CHEEK: ICD-10-CM

## 2018-05-10 DIAGNOSIS — I48.91 ATRIAL FIBRILLATION, UNSPECIFIED TYPE (HCC): ICD-10-CM

## 2018-05-10 DIAGNOSIS — S81.801S LEG WOUND, RIGHT, SEQUELA: ICD-10-CM

## 2018-05-10 DIAGNOSIS — Z79.01 CHRONIC ANTICOAGULATION: ICD-10-CM

## 2018-05-10 DIAGNOSIS — I10 ESSENTIAL HYPERTENSION: Primary | ICD-10-CM

## 2018-05-10 PROCEDURE — G0463 HOSPITAL OUTPT CLINIC VISIT: HCPCS | Performed by: INTERNAL MEDICINE

## 2018-05-10 PROCEDURE — 99215 OFFICE O/P EST HI 40 MIN: CPT | Performed by: INTERNAL MEDICINE

## 2018-05-10 RX ORDER — LISINOPRIL 5 MG/1
TABLET ORAL
Qty: 90 TABLET | Refills: 11 | Status: SHIPPED | OUTPATIENT
Start: 2018-05-10 | End: 2018-05-11

## 2018-05-10 NOTE — PROGRESS NOTES
HPI:   Ernestina Oneill is a 80year old female presents with the following problems. Patient here for follow-up. Her blood pressure is still a little bit elevated. Blood pressure 144/88 on repeat.   Will increase enalapril from 5 mg tablets 2 in the morni is just using Vaseline. This is per direction of Dr. Jamin Armenta. She had some elevated alkaline phosphatase and AST. Etiology unclear. She did have an unremarkable liver ultrasound.   Repeat levels showed that AST is come down to normal.  Alkaline phospha Fibrillation; Treated via Coumadin (as per NG)    • Basal cell carcinoma 12/08/2017    Right mid cheek    • Basal cell carcinoma of left cheek 2013    Mohs.   Dr. Leonila Schumacher   • Basal cell carcinoma of right cheek 2013    EDC   • Basal cell carcinoma, forehea Osmolality 287 275 - 295 mOsm/kg   GFR, Non- 48 (L) >=60   GFR, -American 55 (L) >=60   FASTING Yes    -CBC W/ DIFFERENTIAL   Result Value Ref Range   WBC 5.4 4.0 - 11.0 K/UL   RBC 4.37 3.70 - 5.40 M/UL   HGB 11.4 (L) 12.0 - 16.0 g/d clear.  NECK:  supple, no adenopathy,   LUNGS:  clear to auscultation. Effort normal  CARDIO:  RRR without murmur. S1 and S2 normal.   GI:  good BS's. no masses, organomegaly or tenderness   EXTREMITIES:  no cyanosis, clubbing or edema.    NEURO:  Awake an

## 2018-05-11 ENCOUNTER — TELEPHONE (OUTPATIENT)
Dept: INTERNAL MEDICINE CLINIC | Facility: CLINIC | Age: 83
End: 2018-05-11

## 2018-05-11 RX ORDER — LISINOPRIL 10 MG/1
5 TABLET ORAL 2 TIMES DAILY
Qty: 180 TABLET | Refills: 3 | COMMUNITY
Start: 2018-05-11 | End: 2018-11-21

## 2018-05-11 NOTE — TELEPHONE ENCOUNTER
[facility] faxed request for alternative Rx for Lisinopril 5 mg   *Ins. Only covers max 100 tabs a month. Please change to Lisinopril 1 mg tab every morning & 1 mg tab every evening for a total of 60 tabs a month if appropriate    Fax.  # [phone]    Placed in

## 2018-05-11 NOTE — TELEPHONE ENCOUNTER
Spoke to Corrina---she will take 10 mg BID ;  Currently she has lots of 5 mg tabs and we will not send order to the [facility] yet;   I reviewed with her that when she gets next RX it will be for the 10 mg tabs;   Pt repeated

## 2018-05-13 NOTE — PROGRESS NOTES
Cinthia Chin is a 80year old female. HPI:     CC:  Patient presents with:  Lesion: LOV: 03/21/18. Pt presents with crusty patch on right cheek for x 2-3 weeks. Pt has been applying presumably Imiquimod cream. Pt has personal hx fo AKs and BCC.          Al Apply topically 5 times every week to affected area(s). #24 pack (Patient taking differently: Apply topically 5 times every week to affected area(s).   #24 pack  (Every other day) ) Disp: 12 each Rfl: 1   triamcinolone acetonide 0.1 % External Ointment Use hypertension      Past Surgical History:  No date: HIP REPLACEMENT SURGERY Right      Comment: Right Hip Replacement (as per NG)  No date: HYSTERECTOMY      Comment: and BSO (as per NG)    Social History  Social History   Marital status:   Spouse na Physical Examination:     Well-developed well-nourished patient alert oriented in no acute distress.   Exam performed, including scalp, head, neck, face,nails, hair, external eyes, including conjunctival mucosa, eyelids, lips external ears , arms, digits,pa discussed with patient. Please refer to map for specific lesions. See additional diagnoses. Pros cons of various therapies, risks benefits discussed. Pathophysiology discussed with patient. Therapeutic options reviewed. See  Medications in grid.   Ins

## 2018-05-15 ENCOUNTER — NURSE ONLY (OUTPATIENT)
Dept: WOUND CARE | Facility: HOSPITAL | Age: 83
End: 2018-05-15
Attending: CLINICAL NURSE SPECIALIST
Payer: MEDICARE

## 2018-05-15 DIAGNOSIS — L97.919 VENOUS ULCER OF RIGHT LEG (HCC): Primary | ICD-10-CM

## 2018-05-15 DIAGNOSIS — I83.019 VENOUS ULCER OF RIGHT LEG (HCC): Primary | ICD-10-CM

## 2018-05-15 DIAGNOSIS — I87.2 VENOUS (PERIPHERAL) INSUFFICIENCY: ICD-10-CM

## 2018-05-15 PROCEDURE — 29581 APPL MULTLAYER CMPRN SYS LEG: CPT

## 2018-05-15 PROCEDURE — 99212 OFFICE O/P EST SF 10 MIN: CPT | Performed by: CLINICAL NURSE SPECIALIST

## 2018-05-15 NOTE — PROGRESS NOTES
Rectal adenocarcinoma (CMS/HCC)   Dx   Consultation ; Referred by Mariann Dupree MD   Reason for Visit           Please note that the patient was seen and examined with the assistance of a  by telephone     The patient is a 61-year-old male that I initially evaluated as a second surgical consultation back in March 2017. At that time the he was found to have a near obstructing distal rectal mass that was proven to be adenocarcinoma. Due to the near obstructing nature, he underwent a temporary loop ileostomy. Patient did not have metastatic disease at time of surgery or by subsequent workup. A 3T rectal MRI was consistent with a T3 N2 lesion. The lesion itself is approximately 1-2 cm above the anorectal ring. He has undergone neoadjuvant therapy and now presents for evaluation regarding definitive surgical intervention. Patient has had problems with small bowel obstructions, however, has no complaints at the time of this visit. No significant abdominal pain. His ostomy is functioning without difficulty.     Physical exam     Patient is awake alert  Heart is regular  Lungs are clear  Abdomen is soft, nontender, nondistended. He has a well-healed midline incision without signs or symptoms of infection or hernia. He has a pink, viable, non-prolapsed loop ileostomy in the right lower quadrant. No abdominal masses are palpated.  Extremities are without cyanosis clubbing or edema  Rectal exam: On inspection no pathology was noted. Digital exam was performed and sphincter tone was normal. Just above the anorectal ring patient has a persistent circumferential mass consistent with his primary tumor. This does not appear significantly smaller than his initial exam. There is also a degree of fixation. Exam was somewhat limited secondary to patient discomfort.     Impression T3 N2 M0 adenocarcinoma of the distal rectum just above the anorectal ring status post neoadjuvant therapy     Patient will be taken to  Subjective    Chief Complaint  This information was obtained from the patient  The patient was seen today for follow up and management of difficult to heal wound. 5/15/18 patient complains of tenderness on her right lower leg.     Allergies  no known allergi The periwound skin exhibited: Edema, Dry/Scaly, Erythema, Hemosiderosis.  The periwound skin did not exhibit: Brawny Induration, Excoriation, Induration, Callus, Crepitus, Fluctuance, Friable, Rash, Moist, Maceration, Atrophie Lakesha, Cyanosis, Ecchymosis, surgery for a total proctectomy with total mesorectal excision. He understands that the loop ileostomy will remain in place for at least 6 weeks postoperatively prior to reversal. Patient and his family also understand there is a possibility that a abdominal perineal resection with permanent end colostomy may be necessitated. This decision can only be made at the time of surgery. The risks of this procedure including bleeding, infection, possible permanent ostomy, anastomotic leak, cardiopulmonary complications, and possible need for further surgery were discussed. He is amenable to proceed. Patient's family will be out of town until the beginning of September and his surgery will be scheduled immediately upon their return.           Patient was seen by her primary physician for a follow-up visit. Patient came to visit today with compression wraps off, and noted multiple scattered partial-thickness wounds to right anterior and posterior leg. Edema, ecchymosis  and erythema present.  Dis Clean wound with Normal Saline or Wound Cleanser. - apply betamethasone cream to rash area and vaseline to dry skin  Acticoat  ABD pad  Cover dressing and wrap with harvey. Do not put tape directly on the skin. Change dressing two times per week.     Compr Limb cleansed using Soap and water (1)  Cleansed wound and periwound with non-cytotoxic agent. using 0.9% normal saline (1)  Applied topical product to starr-wound area avoiding wound base.  using Betamethasone valerate 0.1% cream (1), Vaseline (1)  Applied

## 2018-05-16 ENCOUNTER — APPOINTMENT (OUTPATIENT)
Dept: WOUND CARE | Facility: HOSPITAL | Age: 83
End: 2018-05-16
Attending: CLINICAL NURSE SPECIALIST
Payer: MEDICARE

## 2018-05-23 ENCOUNTER — NURSE ONLY (OUTPATIENT)
Dept: WOUND CARE | Facility: HOSPITAL | Age: 83
End: 2018-05-23
Attending: CLINICAL NURSE SPECIALIST
Payer: MEDICARE

## 2018-05-23 DIAGNOSIS — I83.019 VENOUS ULCER OF RIGHT LEG (HCC): Primary | ICD-10-CM

## 2018-05-23 DIAGNOSIS — L97.919 VENOUS ULCER OF RIGHT LEG (HCC): Primary | ICD-10-CM

## 2018-05-23 PROCEDURE — 99212 OFFICE O/P EST SF 10 MIN: CPT | Performed by: CLINICAL NURSE SPECIALIST

## 2018-05-23 PROCEDURE — 29581 APPL MULTLAYER CMPRN SYS LEG: CPT

## 2018-05-23 NOTE — PROGRESS NOTES
Subjective    Chief Complaint  This information was obtained from the patient  The patient was seen today for follow up and management of difficult to heal wound. 5/15/18 patient complains of tenderness on her right lower leg.  05/23/2018 no concerns for to The periwound skin exhibited: Edema, Dry/Scaly, Erythema, Hemosiderosis.  The periwound skin did not exhibit: Brawny Induration, Excoriation, Induration, Callus, Crepitus, Fluctuance, Friable, Rash, Moist, Maceration, Atrophie Lakesha, Cyanosis, Ecchymosis, Wound #14 Right, Proximal, Anterior Lower Leg is a Partial Thickness Venous Ulcer and has received a status of Not Healed.  Initial wound encounter measurements are 0.5cm length x 0.5cm width x 0.1cm depth, with an area of 0.25 sq cm and a volume of 0.025 c Patient has new wound to right anterior leg. Gently rolled Q-tip over wound site and purulent drainage released. No other signs and symptoms of infection noted. Patient denies pain. Right posterior calf wound granular and healing well.  Dakin's solution soa Patient to continue to follow-up in outpatient wound clinic to heal right lower extremity wounds to closure. To transition patient into Velcro compression garments for long-term management of venous insufficiency.  Patient was instructed to bring her Velcro Compression applied to: using Toe bases to tibial tubercle (1)   Compression used: using Artiflex 10 cm (1), Artiflex 15 cm (1), Comprilan 08 cm (1), Comprilan 10 cm (1), Comprilan 12 cm (1)

## 2018-05-30 ENCOUNTER — NURSE ONLY (OUTPATIENT)
Dept: WOUND CARE | Facility: HOSPITAL | Age: 83
End: 2018-05-30
Attending: CLINICAL NURSE SPECIALIST
Payer: MEDICARE

## 2018-05-30 DIAGNOSIS — S81.801D WOUND OF RIGHT LOWER EXTREMITY, SUBSEQUENT ENCOUNTER: ICD-10-CM

## 2018-05-30 DIAGNOSIS — L97.919 VENOUS ULCER OF RIGHT LEG (HCC): Primary | ICD-10-CM

## 2018-05-30 DIAGNOSIS — I83.019 VENOUS ULCER OF RIGHT LEG (HCC): Primary | ICD-10-CM

## 2018-05-30 PROCEDURE — 29581 APPL MULTLAYER CMPRN SYS LEG: CPT

## 2018-05-30 PROCEDURE — 99212 OFFICE O/P EST SF 10 MIN: CPT | Performed by: CLINICAL NURSE SPECIALIST

## 2018-05-30 NOTE — PROGRESS NOTES
Subjective    Chief Complaint  This information was obtained from the patient  The patient was seen today for follow up and management of difficult to heal wound. 5/15/18 patient complains of tenderness on her right lower leg.  05/23/2018 no concerns for to The periwound skin exhibited: Dry/Scaly, Hemosiderosis.  The periwound skin did not exhibit: Brawny Induration, Edema, Excoriation, Induration, Callus, Crepitus, Fluctuance, Friable, Rash, Moist, Maceration, Atrophie [place], Cyanosis, Ecchymosis, Erythema, Patient's right lower extremity wounds healed. Patient has right ankle rash with no open wound present. Patient complained of itching to this area. Betamethasone cream to decrease redness and lessen symptoms of itching.   Vaseline to moisturize excessive Compression applied to: using Toe bases to tibial tubercle (1)  Compression used: using Artiflex 10 cm (1), Artiflex 15 cm (1), Comprilan 08 cm (1), Comprilan 10 cm (1), Comprilan 12 cm (1)  Wound #14 (Right, Proximal, Anterior Lower Leg)  . Wound Treatment

## 2018-06-06 ENCOUNTER — NURSE ONLY (OUTPATIENT)
Dept: WOUND CARE | Facility: HOSPITAL | Age: 83
End: 2018-06-06
Attending: CLINICAL NURSE SPECIALIST
Payer: MEDICARE

## 2018-06-06 DIAGNOSIS — S81.801D WOUND OF RIGHT LOWER EXTREMITY, SUBSEQUENT ENCOUNTER: Primary | ICD-10-CM

## 2018-06-06 DIAGNOSIS — L97.919 VENOUS ULCER OF RIGHT LEG (HCC): ICD-10-CM

## 2018-06-06 DIAGNOSIS — I83.019 VENOUS ULCER OF RIGHT LEG (HCC): ICD-10-CM

## 2018-06-06 PROCEDURE — 29581 APPL MULTLAYER CMPRN SYS LEG: CPT

## 2018-06-06 NOTE — PROGRESS NOTES
Subjective    Chief Complaint  This information was obtained from the patient  The patient was seen today for follow up and management of difficult to heal wound. 06/06/2018 no concerns for today    Allergies  no known allergies    HPI  This information was activewear compression socks as an alternative option but she does not think she will be able to put it on independently. Will continue compression wrap therapy until seen by YEMI Cronin in a week. Patient verbalized understanding.       Procedures    Evonne Mckinney

## 2018-06-13 ENCOUNTER — OFFICE VISIT (OUTPATIENT)
Dept: WOUND CARE | Facility: HOSPITAL | Age: 83
End: 2018-06-13
Attending: CLINICAL NURSE SPECIALIST
Payer: MEDICARE

## 2018-06-13 DIAGNOSIS — R21 RASH AND NONSPECIFIC SKIN ERUPTION: Primary | ICD-10-CM

## 2018-06-13 PROCEDURE — 99212 OFFICE O/P EST SF 10 MIN: CPT | Performed by: CLINICAL NURSE SPECIALIST

## 2018-06-13 NOTE — PROGRESS NOTES
Subjective    Chief Complaint  This information was obtained from the patient  The patient was seen today for follow up and management of difficult to heal wound. 06/13/2018 no concerns for today    Allergies  no known allergies    HPI  This information was Depression        Objective    Constitutional  Height/Length: 6 in (15.24 cm), Weight: 131 lbs (59.55 kgs), BMI: 2558.1, Temperature: 96.8 °F (36 °C), Pulse: 86 bpm, Respiratory Rate: 16 breaths/min, Blood Pressure: 145/98 mmHg, Pulse Oximetry: 95 %. (1)  Cleansed wound and periwound with non-cytotoxic agent. using 0.9% normal saline (1)  Applied topical product to starr-wound area avoiding wound base.  using Other - see notes (1)  Applied topical agent to base of wound bed. using Other - see notes (1)

## 2018-06-14 ENCOUNTER — OFFICE VISIT (OUTPATIENT)
Dept: INTERNAL MEDICINE CLINIC | Facility: CLINIC | Age: 83
End: 2018-06-14

## 2018-06-14 ENCOUNTER — TELEPHONE (OUTPATIENT)
Dept: INTERNAL MEDICINE CLINIC | Facility: CLINIC | Age: 83
End: 2018-06-14

## 2018-06-14 ENCOUNTER — APPOINTMENT (OUTPATIENT)
Dept: LAB | Age: 83
End: 2018-06-14
Attending: INTERNAL MEDICINE
Payer: MEDICARE

## 2018-06-14 VITALS
DIASTOLIC BLOOD PRESSURE: 74 MMHG | HEART RATE: 80 BPM | WEIGHT: 127.38 LBS | OXYGEN SATURATION: 98 % | BODY MASS INDEX: 21.22 KG/M2 | TEMPERATURE: 98 F | HEIGHT: 65 IN | SYSTOLIC BLOOD PRESSURE: 120 MMHG

## 2018-06-14 DIAGNOSIS — R74.8 ELEVATED ALKALINE PHOSPHATASE LEVEL: Primary | ICD-10-CM

## 2018-06-14 DIAGNOSIS — R20.0 HAND NUMBNESS: ICD-10-CM

## 2018-06-14 DIAGNOSIS — R74.8 ELEVATED LIVER ENZYMES: ICD-10-CM

## 2018-06-14 DIAGNOSIS — S81.801S LEG WOUND, RIGHT, SEQUELA: ICD-10-CM

## 2018-06-14 DIAGNOSIS — R79.89 ELEVATED TSH: ICD-10-CM

## 2018-06-14 DIAGNOSIS — R91.1 PULMONARY NODULE: ICD-10-CM

## 2018-06-14 DIAGNOSIS — I10 ESSENTIAL HYPERTENSION: Primary | ICD-10-CM

## 2018-06-14 DIAGNOSIS — Z79.01 CHRONIC ANTICOAGULATION: ICD-10-CM

## 2018-06-14 DIAGNOSIS — R74.8 ELEVATED ALKALINE PHOSPHATASE LEVEL: ICD-10-CM

## 2018-06-14 DIAGNOSIS — I48.91 ATRIAL FIBRILLATION, UNSPECIFIED TYPE (HCC): ICD-10-CM

## 2018-06-14 DIAGNOSIS — C44.319 BASAL CELL CARCINOMA OF RIGHT CHEEK: ICD-10-CM

## 2018-06-14 PROCEDURE — 99215 OFFICE O/P EST HI 40 MIN: CPT | Performed by: INTERNAL MEDICINE

## 2018-06-14 PROCEDURE — G0463 HOSPITAL OUTPT CLINIC VISIT: HCPCS | Performed by: INTERNAL MEDICINE

## 2018-06-14 PROCEDURE — 80053 COMPREHEN METABOLIC PANEL: CPT

## 2018-06-14 PROCEDURE — 36415 COLL VENOUS BLD VENIPUNCTURE: CPT

## 2018-06-14 NOTE — PROGRESS NOTES
HPI:   Leonardo Galo is a 80year old female presents with the following problems. Patient feels generally well. She has no new complaints. She has numbness in both hands. No neck pain. No radiculopathy.   I am wondering if she has carpal tunnel syn Prescriptions:  nystatin-triamcinolone 975747-5.4 UNIT/GM-% External Cream Apply 1 Application topically every 12 (twelve) hours.  Apply to right ankle rash Disp: 1 Tube Rfl: 1   lisinopril 10 MG Oral Tab Take 1 tablet (10 mg total) by mouth 2 (two) times d site     (as per NG)   • Osteoporosis screening 02/23/2009    Dexascan (as per NG)   • Other and unspecified hyperlipidemia     (as per NG)   • Unspecified essential hypertension       Past Surgical History:  No date: HIP REPLACEMENT SURGERY Right      Com Monocyte Absolute 0.6 0.0 - 1.0 K/UL   Eosinophil Absolute 0.0 0.0 - 0.7 K/UL   Basophil Absolute 0.0 0.0 - 0.2 K/UL      Social History:   Smoking status: Never Smoker                                                              Smokeless tobacco: Never not tender, no joint swelling  EXTREMITIES:  no cyanosis, clubbing or edema. There is a faint dorsal pedal pulse right foot. No signs of ischemia.   She has some scaliness right lower extremity but right lower extremity looks markedly improved from prior

## 2018-06-18 ENCOUNTER — TELEPHONE (OUTPATIENT)
Dept: INTERNAL MEDICINE CLINIC | Facility: CLINIC | Age: 83
End: 2018-06-18

## 2018-06-18 DIAGNOSIS — R74.8 ELEVATED LIVER ENZYMES: Primary | ICD-10-CM

## 2018-06-18 NOTE — TELEPHONE ENCOUNTER
Relayed MD message to patient, who verbalized understanding. Lab orders mailed to patients home address.

## 2018-06-18 NOTE — TELEPHONE ENCOUNTER
Please let patient know that her repeat liver enzymes still are a little bit elevated. Not very much. I do not think this is a major problem. However I generated lab order for her to have a repeat in about 1 month.   I would like to see if these normaliz

## 2018-06-26 ENCOUNTER — TELEPHONE (OUTPATIENT)
Dept: INTERNAL MEDICINE CLINIC | Facility: CLINIC | Age: 83
End: 2018-06-26

## 2018-06-26 ENCOUNTER — HOSPITAL ENCOUNTER (OUTPATIENT)
Age: 83
Discharge: ANOTHER HOSPITAL | DRG: 603 | End: 2018-06-26
Attending: EMERGENCY MEDICINE
Payer: MEDICARE

## 2018-06-26 ENCOUNTER — HOSPITAL ENCOUNTER (INPATIENT)
Facility: HOSPITAL | Age: 83
LOS: 2 days | Discharge: HOME | DRG: 603 | End: 2018-06-28
Attending: EMERGENCY MEDICINE | Admitting: HOSPITALIST
Payer: MEDICARE

## 2018-06-26 VITALS
RESPIRATION RATE: 16 BRPM | TEMPERATURE: 98 F | HEART RATE: 119 BPM | DIASTOLIC BLOOD PRESSURE: 97 MMHG | SYSTOLIC BLOOD PRESSURE: 162 MMHG

## 2018-06-26 DIAGNOSIS — L03.115 CELLULITIS OF RIGHT LEG: Primary | ICD-10-CM

## 2018-06-26 DIAGNOSIS — L03.115 CELLULITIS OF RIGHT LOWER LEG: Primary | ICD-10-CM

## 2018-06-26 PROBLEM — D64.9 ANEMIA: Status: ACTIVE | Noted: 2018-06-26

## 2018-06-26 PROBLEM — R73.9 HYPERGLYCEMIA: Status: ACTIVE | Noted: 2018-06-26

## 2018-06-26 LAB
ANION GAP SERPL CALC-SCNC: 7 MMOL/L (ref 0–18)
BASOPHILS # BLD: 0 K/UL (ref 0–0.2)
BASOPHILS NFR BLD: 1 %
BUN SERPL-MCNC: 15 MG/DL (ref 8–20)
BUN/CREAT SERPL: 16.9 (ref 10–20)
CALCIUM SERPL-MCNC: 9 MG/DL (ref 8.5–10.5)
CHLORIDE SERPL-SCNC: 100 MMOL/L (ref 95–110)
CO2 SERPL-SCNC: 29 MMOL/L (ref 22–32)
CREAT SERPL-MCNC: 0.89 MG/DL (ref 0.5–1.5)
EOSINOPHIL # BLD: 0.1 K/UL (ref 0–0.7)
EOSINOPHIL NFR BLD: 1 %
ERYTHROCYTE [DISTWIDTH] IN BLOOD BY AUTOMATED COUNT: 22 % (ref 11–15)
GLUCOSE SERPL-MCNC: 116 MG/DL (ref 70–99)
HCT VFR BLD AUTO: 33.7 % (ref 35–48)
HGB BLD-MCNC: 10.9 G/DL (ref 12–16)
LYMPHOCYTES # BLD: 1.1 K/UL (ref 1–4)
LYMPHOCYTES NFR BLD: 15 %
MCH RBC QN AUTO: 27.2 PG (ref 27–32)
MCHC RBC AUTO-ENTMCNC: 32.5 G/DL (ref 32–37)
MCV RBC AUTO: 83.7 FL (ref 80–100)
MONOCYTES # BLD: 0.8 K/UL (ref 0–1)
MONOCYTES NFR BLD: 11 %
NEUTROPHILS # BLD AUTO: 5 K/UL (ref 1.8–7.7)
NEUTROPHILS NFR BLD: 72 %
OSMOLALITY UR CALC.SUM OF ELEC: 284 MOSM/KG (ref 275–295)
PLATELET # BLD AUTO: 214 K/UL (ref 140–400)
PMV BLD AUTO: 8.4 FL (ref 7.4–10.3)
POTASSIUM SERPL-SCNC: 4.1 MMOL/L (ref 3.3–5.1)
RBC # BLD AUTO: 4.02 M/UL (ref 3.7–5.4)
SODIUM SERPL-SCNC: 136 MMOL/L (ref 136–144)
WBC # BLD AUTO: 7 K/UL (ref 4–11)

## 2018-06-26 PROCEDURE — 99220 INITIAL OBSERVATION CARE,LEVL III: CPT | Performed by: HOSPITALIST

## 2018-06-26 PROCEDURE — 99213 OFFICE O/P EST LOW 20 MIN: CPT

## 2018-06-26 RX ORDER — ONDANSETRON 2 MG/ML
4 INJECTION INTRAMUSCULAR; INTRAVENOUS EVERY 6 HOURS PRN
Status: DISCONTINUED | OUTPATIENT
Start: 2018-06-26 | End: 2018-06-28

## 2018-06-26 RX ORDER — HYDRALAZINE HYDROCHLORIDE 20 MG/ML
10 INJECTION INTRAMUSCULAR; INTRAVENOUS EVERY 4 HOURS PRN
Status: DISCONTINUED | OUTPATIENT
Start: 2018-06-26 | End: 2018-06-28

## 2018-06-26 RX ORDER — LISINOPRIL 10 MG/1
10 TABLET ORAL DAILY
Status: DISCONTINUED | OUTPATIENT
Start: 2018-06-27 | End: 2018-06-28

## 2018-06-26 RX ORDER — HYDROCHLOROTHIAZIDE 25 MG/1
12.5 TABLET ORAL DAILY
Status: DISCONTINUED | OUTPATIENT
Start: 2018-06-27 | End: 2018-06-28

## 2018-06-26 RX ORDER — OYSTER SHELL CALCIUM WITH VITAMIN D 500; 200 MG/1; [IU]/1
1 TABLET, FILM COATED ORAL DAILY
Status: DISCONTINUED | OUTPATIENT
Start: 2018-06-27 | End: 2018-06-28

## 2018-06-26 RX ORDER — 0.9 % SODIUM CHLORIDE 0.9 %
VIAL (ML) INJECTION
Status: COMPLETED
Start: 2018-06-26 | End: 2018-06-26

## 2018-06-26 RX ORDER — ACETAMINOPHEN 325 MG/1
650 TABLET ORAL EVERY 6 HOURS PRN
Status: DISCONTINUED | OUTPATIENT
Start: 2018-06-26 | End: 2018-06-28

## 2018-06-26 NOTE — TELEPHONE ENCOUNTER
Dr. Camille Jacques message relayed to pt who verbalized understanding.   Pt will go to [facility] today - nursing to f/u in AM.

## 2018-06-26 NOTE — TELEPHONE ENCOUNTER
Pt is having inflammation in right leg, it is swollen and pinkish  This is the leg the wound center is treating,  please call pt [phone]   Tasked to nursing high

## 2018-06-26 NOTE — TELEPHONE ENCOUNTER
To Dr. Burton Jennings - see below - R leg. Sx onset: Friday/Saturday. Intermittent pain - 5/10. Feels \"pretty solid\". No fever/chills.

## 2018-06-26 NOTE — TELEPHONE ENCOUNTER
I think we better have patient seen in immediate care. I think this should be checked to make sure she does not have an infection. Also to make sure she does not have a blood clot. Especially if it is \"pretty solid\". Please have her go today if possible.

## 2018-06-26 NOTE — ED INITIAL ASSESSMENT (HPI)
C/o redness and swelling Rt lower which got worse 4 days ago No fever. Was cared for by wound clinic since September 2017.  Sores noted lower ext

## 2018-06-27 ENCOUNTER — APPOINTMENT (OUTPATIENT)
Dept: WOUND CARE | Facility: HOSPITAL | Age: 83
End: 2018-06-27
Attending: CLINICAL NURSE SPECIALIST
Payer: MEDICARE

## 2018-06-27 LAB
ANION GAP SERPL CALC-SCNC: 7 MMOL/L (ref 0–18)
BASOPHILS # BLD: 0.1 K/UL (ref 0–0.2)
BASOPHILS NFR BLD: 1 %
BUN SERPL-MCNC: 14 MG/DL (ref 8–20)
BUN/CREAT SERPL: 17.7 (ref 10–20)
CALCIUM SERPL-MCNC: 8.3 MG/DL (ref 8.5–10.5)
CHLORIDE SERPL-SCNC: 100 MMOL/L (ref 95–110)
CO2 SERPL-SCNC: 27 MMOL/L (ref 22–32)
CREAT SERPL-MCNC: 0.79 MG/DL (ref 0.5–1.5)
EOSINOPHIL # BLD: 0.1 K/UL (ref 0–0.7)
EOSINOPHIL NFR BLD: 2 %
ERYTHROCYTE [DISTWIDTH] IN BLOOD BY AUTOMATED COUNT: 22.1 % (ref 11–15)
GLUCOSE SERPL-MCNC: 85 MG/DL (ref 70–99)
HCT VFR BLD AUTO: 29.8 % (ref 35–48)
HGB BLD-MCNC: 9.7 G/DL (ref 12–16)
LYMPHOCYTES # BLD: 0.8 K/UL (ref 1–4)
LYMPHOCYTES NFR BLD: 15 %
MCH RBC QN AUTO: 27.3 PG (ref 27–32)
MCHC RBC AUTO-ENTMCNC: 32.6 G/DL (ref 32–37)
MCV RBC AUTO: 83.8 FL (ref 80–100)
MONOCYTES # BLD: 0.7 K/UL (ref 0–1)
MONOCYTES NFR BLD: 13 %
NEUTROPHILS # BLD AUTO: 3.8 K/UL (ref 1.8–7.7)
NEUTROPHILS NFR BLD: 70 %
OSMOLALITY UR CALC.SUM OF ELEC: 278 MOSM/KG (ref 275–295)
PLATELET # BLD AUTO: 180 K/UL (ref 140–400)
PMV BLD AUTO: 8.5 FL (ref 7.4–10.3)
POTASSIUM SERPL-SCNC: 3.8 MMOL/L (ref 3.3–5.1)
RBC # BLD AUTO: 3.55 M/UL (ref 3.7–5.4)
SODIUM SERPL-SCNC: 134 MMOL/L (ref 136–144)
WBC # BLD AUTO: 5.4 K/UL (ref 4–11)

## 2018-06-27 PROCEDURE — 99233 SBSQ HOSP IP/OBS HIGH 50: CPT | Performed by: HOSPITALIST

## 2018-06-27 RX ORDER — 0.9 % SODIUM CHLORIDE 0.9 %
VIAL (ML) INJECTION
Status: COMPLETED
Start: 2018-06-27 | End: 2018-06-27

## 2018-06-27 RX ORDER — POTASSIUM CHLORIDE 20 MEQ/1
40 TABLET, EXTENDED RELEASE ORAL ONCE
Status: COMPLETED | OUTPATIENT
Start: 2018-06-27 | End: 2018-06-27

## 2018-06-27 NOTE — PROGRESS NOTES
[facility]    Progress Note    Romayne Ponder Patient Status:  Observation    1922 MRN [id number]   Location [facility] Attending Kerri Story Day # 0 PCP Sabine Mcdonough MD     Subjective:     Constitutio controlled, will resume home medication, use hydralazine for systolic pressures greater than 847 and diastolic pressures greater than 90.     Chronic normocytic anemia  No signs of active bleeding, will continue to monitor H&H.   Consider outpatient workup

## 2018-06-27 NOTE — ED PROVIDER NOTES
Patient Seen in: [facility] Emergency Department    History   Patient presents with:  Swelling Edema (cardiovascular, metabolic)    Stated Complaint:     HPI    79 yo female with right lower extremity redness and swelling since Friday.  Seen at imm (!) 169/103  Pulse: 115  Resp: 20  Temp: 98.1 °F (36.7 °C)  Temp src: Temporal  SpO2: 98 %  O2 Device: None (Room air)    Current:BP (!) 146/101   Pulse 84   Temp 98.1 °F (36.7 °C) (Temporal)   Resp 20   Ht 165.1 cm (5' 5\")   Wt 59 kg   SpO2 96%   BMI 21. WITH PLATELET.   Procedure                               Abnormality         Status                     ---------                               -----------         ------                     CBC W/ DIFFERENTIAL[[id number]]          Abnormal            Prelim

## 2018-06-27 NOTE — TELEPHONE ENCOUNTER
Per [facility] records, pt did report to -  cellulitis of R leg and is now in observation at [facility]    To Dr. Ellsworth Necessary

## 2018-06-27 NOTE — ED NOTES
The patient reports increasing redness to her right lower leg since Friday. The patient denies any fevers, shortness of breath or chest pain.

## 2018-06-27 NOTE — PROGRESS NOTES
[facility] Pharmacy Note:  Renal Adjustment for cefazolin (ANCEF)    Clover Saldivar is a 80year old female who has been prescribed cefazolin (ANCEF) 1000 mg every 8 hrs. CrCl is estimated creatinine clearance is 34 mL/min (based on SCr of 0.89 mg/dL).  so the dose

## 2018-06-27 NOTE — ED NOTES
Patient is safe to transport to floor per ED MD and Dr. Rivera Paredes. Report given to floor RN, [place] [phone]. Transport via cart requested.

## 2018-06-27 NOTE — H&P
Stephany 13 Patient Status:  Emergency    1922 MRN [id number]   Location [address] Attending Robi Sanchez MD   Hosp Day # 0 PCP Nestor Goodpasture, MD     Date:   localized, unspecified site     (as per NG)   • Osteoporosis screening 02/23/2009    Dexascan (as per NG)   • Other and unspecified hyperlipidemia     (as per NG)   • Unspecified essential hypertension      Past Surgical History:  No date: FEMUR FRACTURE S (10 mg total) by mouth 2 (two) times daily. metoprolol Tartrate 25 MG Oral Tab 6/26/2018 at Unknown time  No Yes   Sig: Take 1 tablet (25 mg total) by mouth 2 (two) times daily.    nystatin-triamcinolone 187741-8.3 UNIT/GM-% External Cream not taking  No Normal pulses. Neurologic:  Alert, oriented, no focal deficits, cranial nerves II-XII are grossly intact. Cognition and Speech:  Oriented, speech clear and coherent. Psychiatric:  Cooperative, appropriate mood & affect.       Laboratory Data:     Lab Res

## 2018-06-27 NOTE — PLAN OF CARE
Problem: Patient Centered Care  Goal: Patient preferences are identified and integrated in the patient's plan of care  Interventions:  - What would you like us to know as we care for you?  \"my leg's been red since Friday, I just got did discharged from the

## 2018-06-28 VITALS
TEMPERATURE: 98 F | SYSTOLIC BLOOD PRESSURE: 130 MMHG | DIASTOLIC BLOOD PRESSURE: 75 MMHG | OXYGEN SATURATION: 93 % | BODY MASS INDEX: 21.66 KG/M2 | WEIGHT: 130 LBS | HEIGHT: 65 IN | HEART RATE: 100 BPM | RESPIRATION RATE: 18 BRPM

## 2018-06-28 LAB
ANION GAP SERPL CALC-SCNC: 5 MMOL/L (ref 0–18)
BASOPHILS # BLD: 0 K/UL (ref 0–0.2)
BASOPHILS NFR BLD: 1 %
BUN SERPL-MCNC: 17 MG/DL (ref 8–20)
BUN/CREAT SERPL: 19.5 (ref 10–20)
CALCIUM SERPL-MCNC: 8.2 MG/DL (ref 8.5–10.5)
CHLORIDE SERPL-SCNC: 103 MMOL/L (ref 95–110)
CO2 SERPL-SCNC: 27 MMOL/L (ref 22–32)
CREAT SERPL-MCNC: 0.87 MG/DL (ref 0.5–1.5)
EOSINOPHIL # BLD: 0.1 K/UL (ref 0–0.7)
EOSINOPHIL NFR BLD: 3 %
ERYTHROCYTE [DISTWIDTH] IN BLOOD BY AUTOMATED COUNT: 22 % (ref 11–15)
GLUCOSE SERPL-MCNC: 74 MG/DL (ref 70–99)
HCT VFR BLD AUTO: 29.2 % (ref 35–48)
HGB BLD-MCNC: 9.5 G/DL (ref 12–16)
LYMPHOCYTES # BLD: 0.9 K/UL (ref 1–4)
LYMPHOCYTES NFR BLD: 22 %
MAGNESIUM SERPL-MCNC: 1.9 MG/DL (ref 1.8–2.5)
MCH RBC QN AUTO: 27.2 PG (ref 27–32)
MCHC RBC AUTO-ENTMCNC: 32.7 G/DL (ref 32–37)
MCV RBC AUTO: 83.3 FL (ref 80–100)
MONOCYTES # BLD: 0.5 K/UL (ref 0–1)
MONOCYTES NFR BLD: 14 %
NEUTROPHILS # BLD AUTO: 2.5 K/UL (ref 1.8–7.7)
NEUTROPHILS NFR BLD: 61 %
OSMOLALITY UR CALC.SUM OF ELEC: 280 MOSM/KG (ref 275–295)
PLATELET # BLD AUTO: 173 K/UL (ref 140–400)
PMV BLD AUTO: 8.2 FL (ref 7.4–10.3)
POTASSIUM SERPL-SCNC: 4.1 MMOL/L (ref 3.3–5.1)
POTASSIUM SERPL-SCNC: 4.1 MMOL/L (ref 3.3–5.1)
RBC # BLD AUTO: 3.5 M/UL (ref 3.7–5.4)
SODIUM SERPL-SCNC: 135 MMOL/L (ref 136–144)
WBC # BLD AUTO: 4 K/UL (ref 4–11)

## 2018-06-28 PROCEDURE — 99239 HOSP IP/OBS DSCHRG MGMT >30: CPT | Performed by: HOSPITALIST

## 2018-06-28 RX ORDER — CEPHALEXIN 500 MG/1
500 CAPSULE ORAL 3 TIMES DAILY
Qty: 22 CAPSULE | Refills: 0 | Status: SHIPPED | OUTPATIENT
Start: 2018-06-28 | End: 2018-07-09

## 2018-06-28 RX ORDER — LANOLIN ALCOHOL/MO/W.PET/CERES
1 CREAM (GRAM) TOPICAL 2 TIMES DAILY
Qty: 1 BOTTLE | Refills: 0 | Status: SHIPPED | OUTPATIENT
Start: 2018-06-28

## 2018-06-28 NOTE — PLAN OF CARE
Problem: SKIN/TISSUE INTEGRITY - ADULT  Goal: Skin integrity remains intact  INTERVENTIONS  - Assess and document risk factors for pressure ulcer development  - Assess and document skin integrity  - Monitor for areas of redness and/or skin breakdown  - Ini active/audible; family at bedside.

## 2018-06-28 NOTE — PLAN OF CARE
Problem: Patient Centered Care  Goal: Patient preferences are identified and integrated in the patient's plan of care  Interventions:  - What would you like us to know as we care for you?  \"my leg's been red since Friday, I just got did discharged from the assistance  - Assess pain using appropriate pain scale  - Administer analgesics based on type and severity of pain and evaluate response  - Implement non-pharmacological measures as appropriate and evaluate response  - Consider cultural and social influenc

## 2018-06-29 ENCOUNTER — PATIENT OUTREACH (OUTPATIENT)
Dept: CASE MANAGEMENT | Age: 83
End: 2018-06-29

## 2018-06-29 NOTE — PROGRESS NOTES
Attempted to contact the patient for Post hospital follow up. Unable to leave a message due to the mailbox being full. Will try again at another time. TCM book by date-7/12/2018.

## 2018-06-30 NOTE — DISCHARGE SUMMARY
[facility]    PATIENT'S NAME: Lindsay Villalta   ATTENDING PHYSICIAN: Do Story MD   PATIENT ACCOUNT#:   [id number]    LOCATION:  [address] [place] [address] RECORD #:   [id number]       YOB: 1922  ADMISSION DATE:       06/26/2018 anemia. Follow up with Dr. Sonny Ortiz. 5.   DVT prophylaxis with Eliquis. CONDITION UPON DISCHARGE:  Stable. CODE STATUS:  Full Code which may need to be addressed by Dr. Sonny Ortiz. ACTIVITY:  As tolerated. DIET:  Low salt.     DISCHARGE INSTRUCTIONS

## 2018-07-02 ENCOUNTER — OFFICE VISIT (OUTPATIENT)
Dept: DERMATOLOGY CLINIC | Facility: CLINIC | Age: 83
End: 2018-07-02

## 2018-07-02 ENCOUNTER — HOSPITAL ENCOUNTER (OUTPATIENT)
Dept: ULTRASOUND IMAGING | Facility: HOSPITAL | Age: 83
Discharge: HOME | End: 2018-07-02
Attending: INTERNAL MEDICINE
Payer: MEDICARE

## 2018-07-02 ENCOUNTER — TELEPHONE (OUTPATIENT)
Dept: INTERNAL MEDICINE CLINIC | Facility: CLINIC | Age: 83
End: 2018-07-02

## 2018-07-02 ENCOUNTER — OFFICE VISIT (OUTPATIENT)
Dept: INTERNAL MEDICINE CLINIC | Facility: CLINIC | Age: 83
End: 2018-07-02

## 2018-07-02 VITALS
SYSTOLIC BLOOD PRESSURE: 136 MMHG | BODY MASS INDEX: 21 KG/M2 | TEMPERATURE: 98 F | WEIGHT: 129 LBS | DIASTOLIC BLOOD PRESSURE: 66 MMHG | HEART RATE: 72 BPM

## 2018-07-02 DIAGNOSIS — D23.9 BENIGN NEOPLASM OF SKIN, UNSPECIFIED LOCATION: ICD-10-CM

## 2018-07-02 DIAGNOSIS — L57.0 AK (ACTINIC KERATOSIS): Primary | ICD-10-CM

## 2018-07-02 DIAGNOSIS — I48.91 ATRIAL FIBRILLATION, UNSPECIFIED TYPE (HCC): ICD-10-CM

## 2018-07-02 DIAGNOSIS — I87.2 VENOUS INSUFFICIENCY: ICD-10-CM

## 2018-07-02 DIAGNOSIS — D48.5 NEOPLASM OF UNCERTAIN BEHAVIOR OF SKIN: ICD-10-CM

## 2018-07-02 DIAGNOSIS — Z85.828 HISTORY OF SKIN CANCER: ICD-10-CM

## 2018-07-02 DIAGNOSIS — M79.89 RIGHT LEG SWELLING: ICD-10-CM

## 2018-07-02 DIAGNOSIS — M79.89 RIGHT LEG SWELLING: Primary | ICD-10-CM

## 2018-07-02 DIAGNOSIS — R74.8 ELEVATED LIVER ENZYMES: ICD-10-CM

## 2018-07-02 DIAGNOSIS — L82.1 SEBORRHEIC KERATOSES: ICD-10-CM

## 2018-07-02 DIAGNOSIS — I10 ESSENTIAL HYPERTENSION: ICD-10-CM

## 2018-07-02 PROCEDURE — G0463 HOSPITAL OUTPT CLINIC VISIT: HCPCS | Performed by: DERMATOLOGY

## 2018-07-02 PROCEDURE — 99496 TRANSJ CARE MGMT HIGH F2F 7D: CPT | Performed by: INTERNAL MEDICINE

## 2018-07-02 PROCEDURE — 93971 EXTREMITY STUDY: CPT | Performed by: INTERNAL MEDICINE

## 2018-07-02 PROCEDURE — 99213 OFFICE O/P EST LOW 20 MIN: CPT | Performed by: DERMATOLOGY

## 2018-07-02 RX ORDER — LISINOPRIL 5 MG/1
TABLET ORAL
COMMUNITY
Start: 2018-06-25 | End: 2018-07-02

## 2018-07-02 NOTE — PROGRESS NOTES
HPI:    Nika Dalal is a 80year old female here today for hospital follow up.    She was discharged from Inpatient hospital[facility]  to [address] [place] [address] Date: 6/26/2018  Discharge Date: 6/28/2018  Hospital Discharge Diagnosis: 6/29/2018    Discharg cancer right cheek and is being followed by Dr. Arlyn Edge. There is a 0.5 cm area right forearm that seems suspicious and she states she has a follow-up with Dr. Arlyn Edge in 3 months.     Patient also has history of mildly elevated liver enzymes with last CMP screening (02/23/2009); Other and unspecified hyperlipidemia; and Unspecified essential hypertension. She  has a past surgical history that includes hip replacement surgery (Right); hysterectomy; and femur fracture surgery.     She family history include will try and get at local pharmacy. She will be finishing up with  her Keflex in 2 days. NEURO: Negative ×3.     ASSESSMENT/ PLAN:   Diagnoses and all orders for this visit:    Right leg swelling  -     US VENOUS DOPPLER LEG RIGHT - DIAG IMG (CPT=93971);

## 2018-07-02 NOTE — PROGRESS NOTES
Several attempts made to reach the patient with no return call. Patient completed HFU on 7/2/2018. Closing encounter.

## 2018-07-03 NOTE — TELEPHONE ENCOUNTER
Spoke with Corrina's daughter Janusz Jordan per privacy] who wanted Dr. Dinorah Whitney to know they have enough Keflex to last thru Thursday.

## 2018-07-03 NOTE — TELEPHONE ENCOUNTER
Message noted. Please ask either patient or daughter called Thursday with how her leg looks in if still erythematous I will call in some additional Keflex to last her to our next office visit next week.   Also please ask if they were able to get the [facility]

## 2018-07-03 NOTE — TELEPHONE ENCOUNTER
Dr. Belle Lehman message relayed to pt's son Edson Peterson who verbalized understanding. He will see pt in 1 hour and call back with answers to questions.

## 2018-07-03 NOTE — TELEPHONE ENCOUNTER
LMTCB with daughter Darryl Loving or Onofre Waggoner?   Please call back -/ DR MAHMOOD wanted to check on a few things

## 2018-07-03 NOTE — TELEPHONE ENCOUNTER
Per Cintia Jang who just spoke with pt: Pt wearing tubigrip, per pt: leg looked a little better than yesterday or about the same this AM.  Cintia Jang will have his sister examine leg and will call us back since she saw leg yesterday.

## 2018-07-03 NOTE — TELEPHONE ENCOUNTER
Spoke with patient's son, Nemesio Navarrete. He reports patient's leg is better in color and swelling than yesterday. She will be done with abx on Thursday. Made plan for f/u on appearance of leg on Thursday to assess need for additional abx. To Dr. Reina Mason.    Markus Ansari

## 2018-07-09 ENCOUNTER — OFFICE VISIT (OUTPATIENT)
Dept: INTERNAL MEDICINE CLINIC | Facility: CLINIC | Age: 83
End: 2018-07-09

## 2018-07-09 ENCOUNTER — LAB ENCOUNTER (OUTPATIENT)
Dept: LAB | Age: 83
End: 2018-07-09
Attending: INTERNAL MEDICINE
Payer: MEDICARE

## 2018-07-09 ENCOUNTER — TELEPHONE (OUTPATIENT)
Dept: INTERNAL MEDICINE CLINIC | Facility: CLINIC | Age: 83
End: 2018-07-09

## 2018-07-09 VITALS
HEART RATE: 77 BPM | SYSTOLIC BLOOD PRESSURE: 140 MMHG | OXYGEN SATURATION: 98 % | BODY MASS INDEX: 20.99 KG/M2 | HEIGHT: 65 IN | WEIGHT: 126 LBS | TEMPERATURE: 98 F | DIASTOLIC BLOOD PRESSURE: 76 MMHG

## 2018-07-09 DIAGNOSIS — M79.89 RIGHT LEG SWELLING: ICD-10-CM

## 2018-07-09 DIAGNOSIS — R74.8 ELEVATED LIVER ENZYMES: ICD-10-CM

## 2018-07-09 DIAGNOSIS — D64.9 ANEMIA, UNSPECIFIED TYPE: Primary | ICD-10-CM

## 2018-07-09 DIAGNOSIS — I48.91 ATRIAL FIBRILLATION, UNSPECIFIED TYPE (HCC): ICD-10-CM

## 2018-07-09 DIAGNOSIS — I87.2 VENOUS INSUFFICIENCY: ICD-10-CM

## 2018-07-09 DIAGNOSIS — I10 ESSENTIAL HYPERTENSION: ICD-10-CM

## 2018-07-09 DIAGNOSIS — D64.9 ANEMIA, UNSPECIFIED TYPE: ICD-10-CM

## 2018-07-09 DIAGNOSIS — M79.89 RIGHT LEG SWELLING: Primary | ICD-10-CM

## 2018-07-09 LAB
ALBUMIN SERPL BCP-MCNC: 3.4 G/DL (ref 3.5–4.8)
ALP SERPL-CCNC: 135 U/L (ref 32–100)
ALT SERPL-CCNC: 25 U/L (ref 14–54)
ANION GAP SERPL CALC-SCNC: 8 MMOL/L (ref 0–18)
AST SERPL-CCNC: 52 U/L (ref 15–41)
BASOPHILS # BLD: 0 K/UL (ref 0–0.2)
BASOPHILS NFR BLD: 1 %
BILIRUB DIRECT SERPL-MCNC: 0.1 MG/DL (ref 0–0.2)
BILIRUB SERPL-MCNC: 0.6 MG/DL (ref 0.3–1.2)
BUN SERPL-MCNC: 17 MG/DL (ref 8–20)
BUN/CREAT SERPL: 19.8 (ref 10–20)
CALCIUM SERPL-MCNC: 8.9 MG/DL (ref 8.5–10.5)
CHLORIDE SERPL-SCNC: 99 MMOL/L (ref 95–110)
CO2 SERPL-SCNC: 28 MMOL/L (ref 22–32)
CREAT SERPL-MCNC: 0.86 MG/DL (ref 0.5–1.5)
EOSINOPHIL # BLD: 0.1 K/UL (ref 0–0.7)
EOSINOPHIL NFR BLD: 2 %
ERYTHROCYTE [DISTWIDTH] IN BLOOD BY AUTOMATED COUNT: 20.8 % (ref 11–15)
FERRITIN SERPL IA-MCNC: 15 NG/ML (ref 11–307)
GGT SERPL-CCNC: 16 U/L (ref 7–50)
GLUCOSE SERPL-MCNC: 90 MG/DL (ref 70–99)
HCT VFR BLD AUTO: 32.5 % (ref 35–48)
HGB BLD-MCNC: 10.6 G/DL (ref 12–16)
IRON SATN MFR SERPL: 8 % (ref 15–50)
IRON SERPL-MCNC: 34 MCG/DL (ref 28–170)
LYMPHOCYTES # BLD: 0.7 K/UL (ref 1–4)
LYMPHOCYTES NFR BLD: 14 %
MCH RBC QN AUTO: 27.3 PG (ref 27–32)
MCHC RBC AUTO-ENTMCNC: 32.6 G/DL (ref 32–37)
MCV RBC AUTO: 83.7 FL (ref 80–100)
MONOCYTES # BLD: 0.4 K/UL (ref 0–1)
MONOCYTES NFR BLD: 9 %
NEUTROPHILS # BLD AUTO: 3.4 K/UL (ref 1.8–7.7)
NEUTROPHILS NFR BLD: 74 %
OSMOLALITY UR CALC.SUM OF ELEC: 281 MOSM/KG (ref 275–295)
PLATELET # BLD AUTO: 221 K/UL (ref 140–400)
PMV BLD AUTO: 7.4 FL (ref 7.4–10.3)
POTASSIUM SERPL-SCNC: 4.6 MMOL/L (ref 3.3–5.1)
PROT SERPL-MCNC: 6.6 G/DL (ref 5.9–8.4)
RBC # BLD AUTO: 3.88 M/UL (ref 3.7–5.4)
SODIUM SERPL-SCNC: 135 MMOL/L (ref 136–144)
TIBC SERPL-MCNC: 413 MCG/DL (ref 228–428)
TRANSFERRIN SERPL-MCNC: 313 MG/DL (ref 192–382)
VIT B12 SERPL-MCNC: 414 PG/ML (ref 181–914)
WBC # BLD AUTO: 4.6 K/UL (ref 4–11)

## 2018-07-09 PROCEDURE — 85025 COMPLETE CBC W/AUTO DIFF WBC: CPT

## 2018-07-09 PROCEDURE — 84080 ASSAY ALKALINE PHOSPHATASES: CPT

## 2018-07-09 PROCEDURE — 82728 ASSAY OF FERRITIN: CPT

## 2018-07-09 PROCEDURE — 80076 HEPATIC FUNCTION PANEL: CPT

## 2018-07-09 PROCEDURE — 82607 VITAMIN B-12: CPT

## 2018-07-09 PROCEDURE — 36415 COLL VENOUS BLD VENIPUNCTURE: CPT

## 2018-07-09 PROCEDURE — G0463 HOSPITAL OUTPT CLINIC VISIT: HCPCS | Performed by: INTERNAL MEDICINE

## 2018-07-09 PROCEDURE — 84466 ASSAY OF TRANSFERRIN: CPT

## 2018-07-09 PROCEDURE — 83540 ASSAY OF IRON: CPT

## 2018-07-09 PROCEDURE — 99214 OFFICE O/P EST MOD 30 MIN: CPT | Performed by: INTERNAL MEDICINE

## 2018-07-09 PROCEDURE — 80048 BASIC METABOLIC PNL TOTAL CA: CPT

## 2018-07-09 PROCEDURE — 84075 ASSAY ALKALINE PHOSPHATASE: CPT

## 2018-07-09 PROCEDURE — 82977 ASSAY OF GGT: CPT

## 2018-07-09 NOTE — PROGRESS NOTES
HPI:   Claudia Herring is a 80year old female presents with the following problems. Patient seems to be much happier with the right lower extremity. She did get Tubigrip's.   This is been keeping the swelling down her right lower extremity edema looks bet was a CT scan of the chest October the thousand 17 showing 9 x 22 x 14 mm nodular sclerotic focus in the anterior right fifth rib that was demonstrated with CT scanning May 2011. It is increased in size since 2011. May represent a chondroid process.   It [place]/KEN   • Basal cell carcinoma of right cheek 2013    EDC   • Basal cell carcinoma, forehead 2013    Mohs.   Dr. Tiarra Michael   • Cancer [facility])    • Degenerative joint disease     (as per NG)   • Femur fracture ([facility] [address]     Right Femur fractured (as per NG)   • mg/dL   Calcium, Total 8.3 (L) 8.5 - 10.5 mg/dL   BUN/CREA Ratio 17.7 10.0 - 20.0   Anion Gap 7 0 - 18 mmol/L   Calculated Osmolality 278 275 - 295 mOsm/kg   GFR, Non-African American >60 >=60   GFR, -American >60 >=60   -POTASSIUM   Result Value Re K/UL   Anisocytosis 3+ (A)    Hypochromia 1+    Microcytosis 1+    Polychromasia 1+    Ovalocytes 1+    -CBC W/ DIFFERENTIAL   Result Value Ref Range   WBC 5.4 4.0 - 11.0 K/UL   RBC 3.55 (L) 3.70 - 5.40 M/UL   HGB 9.7 (L) 12.0 - 16.0 g/dL   HCT 29.8 (L) 35 Right arm, Patient Position: Sitting, Cuff Size: adult)   Pulse 77   Temp 97.7 °F (36.5 °C) (Oral)   Ht 5' 5\" (1.651 m)   Wt 126 lb (57.2 kg)   SpO2 98%   BMI 20.97 kg/m²     GENERAL:  well developed, well nourished.   in no apparent distress  SKIN:  no ra hypertension  Blood pressure satisfactory for now. 5. Atrial fibrillation, unspecified type ([address]  On Eliquis. Asymptomatic. Follow-up in 1 month.     Gege Basilio MD  7/9/2018  11:03 AM

## 2018-07-10 ENCOUNTER — TELEPHONE (OUTPATIENT)
Dept: INTERNAL MEDICINE CLINIC | Facility: CLINIC | Age: 83
End: 2018-07-10

## 2018-07-10 LAB
ALK-PHOSPHATASE BONE CALC: 29 U/L
ALK-PHOSPHATASE LIVER CALC: 132 U/L
ALK-PHOSPHATASE OTHER CALC: 0 U/L
ALKALINE PHOSPHATASE: 161 U/L

## 2018-07-10 NOTE — TELEPHONE ENCOUNTER
Please let patient know that I thought her labs came out acceptable. She does have a mild anemia that has developed since she was in the hospital.  It is improved since she was in the hospital.  She is a little bit low on iron.   For this reason I would li

## 2018-07-11 NOTE — TELEPHONE ENCOUNTER
Pt notified labs came out acceptable / DR MAHMOOD   Mild anemia persisits  To start Feosol 65 mg OTC   1 tab bid   Recheck labs in 1 mo   Liver enzymes remain a little elevated   No other changes needed for now/ DR MAHMOOD

## 2018-07-15 NOTE — PROGRESS NOTES
Rachel Calderon is a 80year old female. HPI:     CC:  Patient presents with:  Actinic Keratosis: LOV: 5-2-18. Pt presents today for f/u with face pt would like raised dark lesion to upper back eval. Pt with personal hx of BCC.          Allergies:  Patient ha Prescriptions:  Emollient (EUCERIN) External Lotion Apply 1 Application topically 2 (two) times daily. Cleanse leg each time before application Disp: 1 Bottle Rfl: 0   lisinopril 10 MG Oral Tab Take 5 mg by mouth 2 (two) times daily.    Disp: 180 tablet Rfl NG)  No date: HYSTERECTOMY      Comment: and BSO (as per NG)    Social History  Social History   Marital status:    Spouse name: N/A    Years of education: N/A  Number of children: N/A     Occupational History  None on file     Social History Main To scalp, head, neck, face,nails, hair, external eyes, including conjunctival mucosa, eyelids, lips external ears , arms, digits,palms.      Multiple light to medium brown, well marginated, uniformly pigmented, macules and papules 6 mm and less scattered on ex lesions quite concerning. May need further topical therapy. Leg ulcer is healing follow-up per wound clinic. Compression in place patient recently hospitalized for cellulitis on antibiotics continue.   Follow-up for this as planned  Need for compress

## 2018-08-06 ENCOUNTER — TELEPHONE (OUTPATIENT)
Dept: INTERNAL MEDICINE CLINIC | Facility: CLINIC | Age: 83
End: 2018-08-06

## 2018-08-06 ENCOUNTER — LAB ENCOUNTER (OUTPATIENT)
Dept: LAB | Age: 83
End: 2018-08-06
Attending: INTERNAL MEDICINE
Payer: MEDICARE

## 2018-08-06 ENCOUNTER — OFFICE VISIT (OUTPATIENT)
Dept: INTERNAL MEDICINE CLINIC | Facility: CLINIC | Age: 83
End: 2018-08-06
Payer: MEDICARE

## 2018-08-06 VITALS
WEIGHT: 131.63 LBS | TEMPERATURE: 98 F | OXYGEN SATURATION: 93 % | HEART RATE: 77 BPM | BODY MASS INDEX: 21.93 KG/M2 | SYSTOLIC BLOOD PRESSURE: 130 MMHG | DIASTOLIC BLOOD PRESSURE: 80 MMHG | HEIGHT: 65 IN

## 2018-08-06 DIAGNOSIS — M79.89 SWELLING OF LOWER EXTREMITY: Primary | ICD-10-CM

## 2018-08-06 DIAGNOSIS — I48.91 ATRIAL FIBRILLATION, UNSPECIFIED TYPE (HCC): ICD-10-CM

## 2018-08-06 DIAGNOSIS — I10 ESSENTIAL HYPERTENSION: ICD-10-CM

## 2018-08-06 DIAGNOSIS — R74.8 ELEVATED LIVER ENZYMES: ICD-10-CM

## 2018-08-06 DIAGNOSIS — D64.9 ANEMIA, UNSPECIFIED TYPE: Primary | ICD-10-CM

## 2018-08-06 DIAGNOSIS — R74.8 ELEVATED ALKALINE PHOSPHATASE LEVEL: ICD-10-CM

## 2018-08-06 DIAGNOSIS — C44.319 BASAL CELL CARCINOMA OF RIGHT CHEEK: ICD-10-CM

## 2018-08-06 DIAGNOSIS — I36.1 NON-RHEUMATIC TRICUSPID VALVE INSUFFICIENCY: ICD-10-CM

## 2018-08-06 DIAGNOSIS — D64.9 ANEMIA, UNSPECIFIED TYPE: ICD-10-CM

## 2018-08-06 DIAGNOSIS — I87.2 VENOUS INSUFFICIENCY: ICD-10-CM

## 2018-08-06 LAB
ALBUMIN SERPL BCP-MCNC: 3.6 G/DL (ref 3.5–4.8)
ALBUMIN/GLOB SERPL: 1.2 {RATIO} (ref 1–2)
ALP SERPL-CCNC: 139 U/L (ref 32–100)
ALT SERPL-CCNC: 38 U/L (ref 14–54)
ANION GAP SERPL CALC-SCNC: 8 MMOL/L (ref 0–18)
AST SERPL-CCNC: 75 U/L (ref 15–41)
BASOPHILS # BLD: 0.1 K/UL (ref 0–0.2)
BASOPHILS NFR BLD: 2 %
BILIRUB SERPL-MCNC: 0.6 MG/DL (ref 0.3–1.2)
BUN SERPL-MCNC: 15 MG/DL (ref 8–20)
BUN/CREAT SERPL: 16.7 (ref 10–20)
CALCIUM SERPL-MCNC: 8.8 MG/DL (ref 8.5–10.5)
CHLORIDE SERPL-SCNC: 101 MMOL/L (ref 95–110)
CO2 SERPL-SCNC: 29 MMOL/L (ref 22–32)
CREAT SERPL-MCNC: 0.9 MG/DL (ref 0.5–1.5)
EOSINOPHIL # BLD: 0.1 K/UL (ref 0–0.7)
EOSINOPHIL NFR BLD: 3 %
ERYTHROCYTE [DISTWIDTH] IN BLOOD BY AUTOMATED COUNT: 21.3 % (ref 11–15)
FERRITIN SERPL IA-MCNC: 28 NG/ML (ref 11–307)
GLOBULIN PLAS-MCNC: 3.1 G/DL (ref 2.5–3.7)
GLUCOSE SERPL-MCNC: 78 MG/DL (ref 70–99)
HCT VFR BLD AUTO: 35.2 % (ref 35–48)
HGB BLD-MCNC: 11.4 G/DL (ref 12–16)
IRON SATN MFR SERPL: 29 % (ref 15–50)
IRON SERPL-MCNC: 108 MCG/DL (ref 28–170)
LYMPHOCYTES # BLD: 0.8 K/UL (ref 1–4)
LYMPHOCYTES NFR BLD: 20 %
MCH RBC QN AUTO: 28.5 PG (ref 27–32)
MCHC RBC AUTO-ENTMCNC: 32.3 G/DL (ref 32–37)
MCV RBC AUTO: 88.3 FL (ref 80–100)
MONOCYTES # BLD: 0.5 K/UL (ref 0–1)
MONOCYTES NFR BLD: 11 %
NEUTROPHILS # BLD AUTO: 2.8 K/UL (ref 1.8–7.7)
NEUTROPHILS NFR BLD: 65 %
OSMOLALITY UR CALC.SUM OF ELEC: 286 MOSM/KG (ref 275–295)
PATIENT FASTING: NO
PLATELET # BLD AUTO: 198 K/UL (ref 140–400)
PMV BLD AUTO: 9 FL (ref 7.4–10.3)
POTASSIUM SERPL-SCNC: 4.5 MMOL/L (ref 3.3–5.1)
PROT SERPL-MCNC: 6.7 G/DL (ref 5.9–8.4)
RBC # BLD AUTO: 3.99 M/UL (ref 3.7–5.4)
SODIUM SERPL-SCNC: 138 MMOL/L (ref 136–144)
TIBC SERPL-MCNC: 376 MCG/DL (ref 228–428)
TRANSFERRIN SERPL-MCNC: 285 MG/DL (ref 192–382)
WBC # BLD AUTO: 4.3 K/UL (ref 4–11)

## 2018-08-06 PROCEDURE — 85025 COMPLETE CBC W/AUTO DIFF WBC: CPT

## 2018-08-06 PROCEDURE — G0463 HOSPITAL OUTPT CLINIC VISIT: HCPCS | Performed by: INTERNAL MEDICINE

## 2018-08-06 PROCEDURE — 99214 OFFICE O/P EST MOD 30 MIN: CPT | Performed by: INTERNAL MEDICINE

## 2018-08-06 PROCEDURE — 36415 COLL VENOUS BLD VENIPUNCTURE: CPT

## 2018-08-06 PROCEDURE — 80053 COMPREHEN METABOLIC PANEL: CPT

## 2018-08-06 PROCEDURE — 83540 ASSAY OF IRON: CPT

## 2018-08-06 PROCEDURE — 82728 ASSAY OF FERRITIN: CPT

## 2018-08-06 PROCEDURE — 84466 ASSAY OF TRANSFERRIN: CPT

## 2018-08-06 NOTE — PROGRESS NOTES
HPI:   Gilbert oSn is a 80year old female presents with the following problems. Patient here for follow-up of right lower extremity swelling and ulceration. Patient has no ulcerations. No weakness.   Skin is bumpy that the patient states is like a \" Outpatient Prescriptions:  triamcinolone acetonide 0.1 % External Cream Apply topically 2 (two) times daily as needed. Disp: 60 g Rfl: 3   Emollient (EUCERIN) External Lotion Apply 1 Application topically 2 (two) times daily.  Cleanse leg each time before a SURGERY  No date: HIP REPLACEMENT SURGERY Right      Comment: Right Hip Replacement (as per NG)  No date: HYSTERECTOMY      Comment: and BSO (as per NG)   Family History   Problem Relation Age of Onset   • Stroke Mother      (as per NG)   • Breast Cancer S -HOLD MMA   Result Value Ref Range   HOLD MMA Auto Resulted    -CBC W/ DIFFERENTIAL   Result Value Ref Range   WBC 4.6 4.0 - 11.0 K/UL   RBC 3.88 3.70 - 5.40 M/UL   HGB 10.6 (L) 12.0 - 16.0 g/dL   HCT 32.5 (L) 35.0 - 48.0 %   MCV 83.7 80.0 - 100.0 fL   M [place]. HEENT:  Atraumatic. She wears hearing aids. Right TM and ear canal clear. Left ear canal with wax. Patient was referred to ENT for wax removal.  She does wear hearing aids. Pharynx without exudate or erythema  EYES:  PERRL .  conjunctiva clear MD  8/6/2018  12:06 PM

## 2018-08-10 ENCOUNTER — TELEPHONE (OUTPATIENT)
Dept: INTERNAL MEDICINE CLINIC | Facility: CLINIC | Age: 83
End: 2018-08-10

## 2018-08-10 NOTE — TELEPHONE ENCOUNTER
Please let patient know that her blood count is much better. Anemia better. She still has 2 elevated enzymes that seem to be coming from the liver. For now there is stable. I do not think anything else needs to be done about it.   We will continue to mo

## 2018-09-18 ENCOUNTER — LAB ENCOUNTER (OUTPATIENT)
Dept: LAB | Age: 83
End: 2018-09-18
Attending: INTERNAL MEDICINE
Payer: MEDICARE

## 2018-09-18 ENCOUNTER — TELEPHONE (OUTPATIENT)
Dept: INTERNAL MEDICINE CLINIC | Facility: CLINIC | Age: 83
End: 2018-09-18

## 2018-09-18 ENCOUNTER — OFFICE VISIT (OUTPATIENT)
Dept: INTERNAL MEDICINE CLINIC | Facility: CLINIC | Age: 83
End: 2018-09-18
Payer: MEDICARE

## 2018-09-18 VITALS
BODY MASS INDEX: 21.33 KG/M2 | HEIGHT: 65 IN | DIASTOLIC BLOOD PRESSURE: 90 MMHG | TEMPERATURE: 98 F | OXYGEN SATURATION: 96 % | SYSTOLIC BLOOD PRESSURE: 140 MMHG | HEART RATE: 69 BPM | WEIGHT: 128 LBS

## 2018-09-18 DIAGNOSIS — R74.8 ELEVATED LIVER ENZYMES: ICD-10-CM

## 2018-09-18 DIAGNOSIS — M79.89 SWELLING OF LOWER EXTREMITY: ICD-10-CM

## 2018-09-18 DIAGNOSIS — D64.9 ANEMIA, UNSPECIFIED TYPE: Primary | ICD-10-CM

## 2018-09-18 DIAGNOSIS — Z23 NEED FOR IMMUNIZATION AGAINST INFLUENZA: Primary | ICD-10-CM

## 2018-09-18 DIAGNOSIS — I10 ESSENTIAL HYPERTENSION: ICD-10-CM

## 2018-09-18 DIAGNOSIS — D64.9 ANEMIA, UNSPECIFIED TYPE: ICD-10-CM

## 2018-09-18 DIAGNOSIS — Z23 FLU VACCINE NEED: ICD-10-CM

## 2018-09-18 DIAGNOSIS — R74.8 ELEVATED ALKALINE PHOSPHATASE LEVEL: ICD-10-CM

## 2018-09-18 LAB
ALBUMIN SERPL BCP-MCNC: 3.7 G/DL (ref 3.5–4.8)
ALBUMIN/GLOB SERPL: 1.2 {RATIO} (ref 1–2)
ALP SERPL-CCNC: 120 U/L (ref 32–100)
ALT SERPL-CCNC: 35 U/L (ref 14–54)
ANION GAP SERPL CALC-SCNC: 9 MMOL/L (ref 0–18)
AST SERPL-CCNC: 80 U/L (ref 15–41)
BASOPHILS # BLD: 0 K/UL (ref 0–0.2)
BASOPHILS NFR BLD: 1 %
BILIRUB SERPL-MCNC: 0.7 MG/DL (ref 0.3–1.2)
BUN SERPL-MCNC: 19 MG/DL (ref 8–20)
BUN/CREAT SERPL: 17.8 (ref 10–20)
CALCIUM SERPL-MCNC: 9.5 MG/DL (ref 8.5–10.5)
CHLORIDE SERPL-SCNC: 102 MMOL/L (ref 95–110)
CO2 SERPL-SCNC: 28 MMOL/L (ref 22–32)
CREAT SERPL-MCNC: 1.07 MG/DL (ref 0.5–1.5)
EOSINOPHIL # BLD: 0.1 K/UL (ref 0–0.7)
EOSINOPHIL NFR BLD: 1 %
ERYTHROCYTE [DISTWIDTH] IN BLOOD BY AUTOMATED COUNT: 19.2 % (ref 11–15)
FERRITIN SERPL IA-MCNC: 29 NG/ML (ref 11–307)
GLOBULIN PLAS-MCNC: 3.2 G/DL (ref 2.5–3.7)
GLUCOSE SERPL-MCNC: 94 MG/DL (ref 70–99)
HCT VFR BLD AUTO: 39.5 % (ref 35–48)
HGB BLD-MCNC: 12.7 G/DL (ref 12–16)
IRON SATN MFR SERPL: 58 % (ref 15–50)
IRON SERPL-MCNC: 213 MCG/DL (ref 28–170)
LYMPHOCYTES # BLD: 0.6 K/UL (ref 1–4)
LYMPHOCYTES NFR BLD: 13 %
MCH RBC QN AUTO: 29.4 PG (ref 27–32)
MCHC RBC AUTO-ENTMCNC: 32.2 G/DL (ref 32–37)
MCV RBC AUTO: 91.3 FL (ref 80–100)
MONOCYTES # BLD: 0.4 K/UL (ref 0–1)
MONOCYTES NFR BLD: 8 %
NEUTROPHILS # BLD AUTO: 3.8 K/UL (ref 1.8–7.7)
NEUTROPHILS NFR BLD: 77 %
OSMOLALITY UR CALC.SUM OF ELEC: 290 MOSM/KG (ref 275–295)
PATIENT FASTING: NO
PLATELET # BLD AUTO: 154 K/UL (ref 140–400)
PMV BLD AUTO: 8.7 FL (ref 7.4–10.3)
POTASSIUM SERPL-SCNC: 4.5 MMOL/L (ref 3.3–5.1)
PROT SERPL-MCNC: 6.9 G/DL (ref 5.9–8.4)
RBC # BLD AUTO: 4.33 M/UL (ref 3.7–5.4)
SODIUM SERPL-SCNC: 139 MMOL/L (ref 136–144)
TIBC SERPL-MCNC: 367 MCG/DL (ref 228–428)
TRANSFERRIN SERPL-MCNC: 278 MG/DL (ref 192–382)
WBC # BLD AUTO: 5 K/UL (ref 4–11)

## 2018-09-18 PROCEDURE — G0463 HOSPITAL OUTPT CLINIC VISIT: HCPCS | Performed by: INTERNAL MEDICINE

## 2018-09-18 PROCEDURE — 99214 OFFICE O/P EST MOD 30 MIN: CPT | Performed by: INTERNAL MEDICINE

## 2018-09-18 PROCEDURE — 82728 ASSAY OF FERRITIN: CPT

## 2018-09-18 PROCEDURE — 80053 COMPREHEN METABOLIC PANEL: CPT

## 2018-09-18 PROCEDURE — 83540 ASSAY OF IRON: CPT

## 2018-09-18 PROCEDURE — G0008 ADMIN INFLUENZA VIRUS VAC: HCPCS | Performed by: INTERNAL MEDICINE

## 2018-09-18 PROCEDURE — 36415 COLL VENOUS BLD VENIPUNCTURE: CPT

## 2018-09-18 PROCEDURE — 90653 IIV ADJUVANT VACCINE IM: CPT | Performed by: INTERNAL MEDICINE

## 2018-09-18 PROCEDURE — 85025 COMPLETE CBC W/AUTO DIFF WBC: CPT

## 2018-09-18 PROCEDURE — 84466 ASSAY OF TRANSFERRIN: CPT

## 2018-09-18 NOTE — PROGRESS NOTES
HPI:   Rachel Calderon is a 80year old female presents with the following problems. Patient generally feels well. Patient has lower extremity edema. This is chronic. She is on Eliquis. I believe she has venous insufficiency.   However thankfully ther Tab Take 1 tablet (25 mg total) by mouth 2 (two) times daily. Disp: 60 tablet Rfl: 11   Calcium Carbonate-Vit D-Min (CALCIUM 600 + MINERALS OR) Take 1 tablet by mouth daily.  Disp:  Rfl:    acetaminophen 325 MG Oral Tab Take 325 mg by mouth every 6 (six) ho Iron Saturation 29 15 - 50 %    Transferrin 285 192 - 382 mg/dL   COMP METABOLIC PANEL (14)   Result Value Ref Range    Glucose 78 70 - 99 mg/dL    Sodium 138 136 - 144 mmol/L    Potassium 4.5 3.3 - 5.1 mmol/L    Chloride 101 95 - 110 mmol/L    CO2 29 2 throat  LUNGS:  Voices no shortness of breath or cough  CARDIOVASCULAR:  Voices no chest pain or palpitations  GI:  Voices no abdominal pain, blood in stool, changes in bowels  :  Voices no dysuria or frequency  NEURO:  Voices no headaches or dizziness up will be determined after review of labs.     Michela Savage MD  9/18/2018  10:58 AM

## 2018-09-19 RX ORDER — APIXABAN 2.5 MG/1
TABLET, FILM COATED ORAL
Qty: 60 TABLET | Refills: 11 | Status: SHIPPED | OUTPATIENT
Start: 2018-09-19 | End: 2019-10-14

## 2018-09-21 ENCOUNTER — TELEPHONE (OUTPATIENT)
Dept: INTERNAL MEDICINE CLINIC | Facility: CLINIC | Age: 83
End: 2018-09-21

## 2018-09-21 NOTE — TELEPHONE ENCOUNTER
Please notify patient that her labs came out satisfactory. No more anemia. Liver enzymes are slightly elevated as they have been before.  I  am not sure why but they are not significantly more elevated than they have in the past.  Therefore for now nothin

## 2018-10-15 ENCOUNTER — OFFICE VISIT (OUTPATIENT)
Dept: DERMATOLOGY CLINIC | Facility: CLINIC | Age: 83
End: 2018-10-15
Payer: MEDICARE

## 2018-10-15 DIAGNOSIS — D23.9 BENIGN NEOPLASM OF SKIN, UNSPECIFIED LOCATION: ICD-10-CM

## 2018-10-15 DIAGNOSIS — Z85.828 HISTORY OF SKIN CANCER: ICD-10-CM

## 2018-10-15 DIAGNOSIS — L57.0 AK (ACTINIC KERATOSIS): Primary | ICD-10-CM

## 2018-10-15 DIAGNOSIS — L82.1 SEBORRHEIC KERATOSES: ICD-10-CM

## 2018-10-15 PROCEDURE — 17000 DESTRUCT PREMALG LESION: CPT | Performed by: DERMATOLOGY

## 2018-10-15 PROCEDURE — 17003 DESTRUCT PREMALG LES 2-14: CPT | Performed by: DERMATOLOGY

## 2018-10-15 PROCEDURE — 99213 OFFICE O/P EST LOW 20 MIN: CPT | Performed by: DERMATOLOGY

## 2018-10-28 NOTE — PROGRESS NOTES
Diana Garcia is a 80year old female. HPI:     CC:  Patient presents with:  Actinic Keratosis: LOV: 7-2-18. Pt presents today for f/u with face and flor arms pt with personal hx of BCC. Allergies:  Patient has no known allergies.     HISTORY:    Pas TAKE 1 TABLET (25 MG TOTAL) BY MOUTH 2 (TWO) TIMES DAILY. Disp: 60 tablet Rfl: 11   ELIQUIS 2.5 MG Oral Tab TAKE 1 TABLET (2.5 MG TOTAL) BY MOUTH 2 (TWO) TIMES DAILY.  Disp: 60 tablet Rfl: 11   Emollient (EUCERIN) External Lotion Apply 1 Application topical Social History    Socioeconomic History      Marital status:        Spouse name: Not on file      Number of children: Not on file      Years of education: Not on file      Highest education level: Not on file    Social Needs      Financial resour Narrative      Not on file    Family History   Problem Relation Age of Onset   • Stroke Mother         (as per NG)   • Breast Cancer Sister         (as per NG)   • Kidney Disease Brother 5        Cause of death (as per NG)   • Cancer Brother         hx of of skin, unspecified location  Well-healed excision prior BCC right mid cheek. Status post excision with Dr. Odalis Wakefield December 2017 excellent cosmetic outcome patient did well with this. No recurrence skin cancer.       Papular lesion right mid brow per Therapeutic options reviewed. See  Medications in grid. Instructions reviewed at length. Benign nevi, seborrheic  keratoses, cherry angiomas:  Reassurance regarding other benign skin lesions. Signs and symptoms of skin cancer, ABCDE's of melanoma discu

## 2018-11-19 ENCOUNTER — LAB ENCOUNTER (OUTPATIENT)
Dept: LAB | Age: 83
End: 2018-11-19
Attending: INTERNAL MEDICINE
Payer: MEDICARE

## 2018-11-19 ENCOUNTER — OFFICE VISIT (OUTPATIENT)
Dept: INTERNAL MEDICINE CLINIC | Facility: CLINIC | Age: 83
End: 2018-11-19
Payer: MEDICARE

## 2018-11-19 ENCOUNTER — TELEPHONE (OUTPATIENT)
Dept: INTERNAL MEDICINE CLINIC | Facility: CLINIC | Age: 83
End: 2018-11-19

## 2018-11-19 VITALS
TEMPERATURE: 98 F | BODY MASS INDEX: 21.05 KG/M2 | DIASTOLIC BLOOD PRESSURE: 90 MMHG | SYSTOLIC BLOOD PRESSURE: 160 MMHG | WEIGHT: 126.38 LBS | HEIGHT: 65 IN | HEART RATE: 80 BPM

## 2018-11-19 DIAGNOSIS — I10 ESSENTIAL HYPERTENSION: ICD-10-CM

## 2018-11-19 DIAGNOSIS — R74.8 ELEVATED ALKALINE PHOSPHATASE LEVEL: ICD-10-CM

## 2018-11-19 DIAGNOSIS — I87.2 VENOUS INSUFFICIENCY: ICD-10-CM

## 2018-11-19 DIAGNOSIS — R74.8 ELEVATED LIVER ENZYMES: ICD-10-CM

## 2018-11-19 DIAGNOSIS — I10 ESSENTIAL HYPERTENSION: Primary | ICD-10-CM

## 2018-11-19 DIAGNOSIS — I07.1 TRICUSPID VALVE INSUFFICIENCY, UNSPECIFIED ETIOLOGY: ICD-10-CM

## 2018-11-19 PROCEDURE — 36415 COLL VENOUS BLD VENIPUNCTURE: CPT

## 2018-11-19 PROCEDURE — 85025 COMPLETE CBC W/AUTO DIFF WBC: CPT

## 2018-11-19 PROCEDURE — G0463 HOSPITAL OUTPT CLINIC VISIT: HCPCS | Performed by: INTERNAL MEDICINE

## 2018-11-19 PROCEDURE — 80053 COMPREHEN METABOLIC PANEL: CPT

## 2018-11-19 PROCEDURE — 99214 OFFICE O/P EST MOD 30 MIN: CPT | Performed by: INTERNAL MEDICINE

## 2018-11-19 NOTE — PROGRESS NOTES
HPI:   Brenda Granados is a 80year old female presents with the following problems. Patient is 80years of age. She still has lower extremity swelling. I suspect this may be due to her moderate to severe tricuspid regurgitation.   I have asked her to fol TOTAL) BY MOUTH 2 (TWO) TIMES DAILY. Disp: 60 tablet Rfl: 11   ELIQUIS 2.5 MG Oral Tab TAKE 1 TABLET (2.5 MG TOTAL) BY MOUTH 2 (TWO) TIMES DAILY.  Disp: 60 tablet Rfl: 11   Emollient (EUCERIN) External Lotion Apply 1 Application topically 2 (two) times jah Cause of death (as per NG)   • Cancer Brother         hx of SC ( unknown type)    • Cancer Daughter         Gastric Cancer (as per NG)     Results for orders placed or performed in visit on 79/46/64   COMP METABOLIC PANEL (14)   Result Value Ref Ran Smoker      Smokeless tobacco: Never Used    Alcohol use: Yes      Alcohol/week: 0.6 oz      Types: 1 Glasses of wine per week      Comment: daily    Drug use: No         REVIEW OF SYSTEMS:   GENERAL:  feels well. No acute distress.    EYES: Voices no blur PLATELET; Future  - COMP METABOLIC PANEL (14); Future    2. Elevated alkaline phosphatase level  Recheck liver enzyme. Patient had a recent unremarkable liver enzyme. 3. Venous insufficiency  Patient keeping this at bay    4.  Elevated liver enzymes  Re

## 2018-11-20 ENCOUNTER — TELEPHONE (OUTPATIENT)
Dept: INTERNAL MEDICINE CLINIC | Facility: CLINIC | Age: 83
End: 2018-11-20

## 2018-11-21 RX ORDER — HYDROCHLOROTHIAZIDE 12.5 MG/1
12.5 TABLET ORAL
COMMUNITY
End: 2018-11-29

## 2018-11-21 RX ORDER — LISINOPRIL 5 MG/1
5 TABLET ORAL
COMMUNITY
End: 2019-05-30

## 2018-11-21 NOTE — TELEPHONE ENCOUNTER
I'm sorry. I forget to also ask if she os on HCTZ. We thought she was on it last visit but was not on medication list. If she is then ask her to increase her enalapril to two 5 mg tablets in AM and two tablets every PM. See me 1 month for BP check.

## 2018-11-21 NOTE — TELEPHONE ENCOUNTER
Relayed MD's message to patient---verbalized understanding  Pt looked at her medication bottle and states she is taking lisinopril 5mg tablet size.  She takes 2 tablets in the morning and 1 tablet in the evening    To Dr. Adriano Munroe

## 2018-11-21 NOTE — TELEPHONE ENCOUNTER
Pt states she is taking HCTZ 12.5 mg daily - so per DR MAHMOOD  She will take Enalapril 5 mg   2 tabs morning and 2 tabs evening / DR MAHMOOD  To see DR MAHMOOD in 1 mo for b/p check  Pt understands all instructions    Med list updated

## 2018-11-21 NOTE — TELEPHONE ENCOUNTER
Please notify patient that her labs looked stable. She has 2 enzymes are mildly elevated but they are stable. For now no changes needed. 1 please ask her to confirm the tablet size of the lisinopril I have around.   She had a 10 mg tablet size and that sh

## 2018-11-29 RX ORDER — HYDROCHLOROTHIAZIDE 12.5 MG/1
TABLET ORAL
Qty: 30 TABLET | Refills: 11 | Status: SHIPPED | OUTPATIENT
Start: 2018-11-29 | End: 2019-03-12

## 2018-12-26 ENCOUNTER — OFFICE VISIT (OUTPATIENT)
Dept: INTERNAL MEDICINE CLINIC | Facility: CLINIC | Age: 83
End: 2018-12-26
Payer: MEDICARE

## 2018-12-26 ENCOUNTER — TELEPHONE (OUTPATIENT)
Dept: INTERNAL MEDICINE CLINIC | Facility: CLINIC | Age: 83
End: 2018-12-26

## 2018-12-26 VITALS
OXYGEN SATURATION: 93 % | WEIGHT: 129 LBS | HEIGHT: 64 IN | TEMPERATURE: 98 F | HEART RATE: 86 BPM | DIASTOLIC BLOOD PRESSURE: 86 MMHG | BODY MASS INDEX: 22.02 KG/M2 | SYSTOLIC BLOOD PRESSURE: 144 MMHG

## 2018-12-26 DIAGNOSIS — J40 BRONCHITIS: Primary | ICD-10-CM

## 2018-12-26 DIAGNOSIS — R05.9 COUGH: ICD-10-CM

## 2018-12-26 PROCEDURE — 99213 OFFICE O/P EST LOW 20 MIN: CPT | Performed by: INTERNAL MEDICINE

## 2018-12-26 PROCEDURE — G0463 HOSPITAL OUTPT CLINIC VISIT: HCPCS | Performed by: INTERNAL MEDICINE

## 2018-12-26 RX ORDER — LEVALBUTEROL TARTRATE 45 UG/1
2 AEROSOL, METERED ORAL EVERY 6 HOURS PRN
Qty: 1 INHALER | Refills: 3 | Status: SHIPPED | OUTPATIENT
Start: 2018-12-26 | End: 2019-09-17 | Stop reason: ALTCHOICE

## 2018-12-26 RX ORDER — PREDNISONE 20 MG/1
20 TABLET ORAL DAILY
Qty: 5 TABLET | Refills: 0 | Status: SHIPPED | OUTPATIENT
Start: 2018-12-26 | End: 2018-12-28 | Stop reason: ALTCHOICE

## 2018-12-26 NOTE — TELEPHONE ENCOUNTER
oscar molina who states she has productive cough , wheezing , denies fever - toño with  at 3:30 today

## 2018-12-26 NOTE — PROGRESS NOTES
Scot Bravo is a 80year old female. Patient presents with:  Cough  Wheezing      HPI:   Scot Bravo is a 80year old female who presents for: cough, wheezing    Started with a runny nose, and then progressed to chest congestion and nonproductive cough.  H forehead 2013    Mohs.   Dr. Maciej Rooney   • Cancer [facility])    • Degenerative joint disease     (as per NG)   • Femur fracture ([address]     Right Femur fractured (as per NG)   • Heart disease    • Hiatal hernia     (as per NG)   • High blood pressure    • Lipid scr patent  NECK: supple, no carotic bruits, no thyromegaly, no cervical or supraclavicular LAD  LUNGS: b/l wheezing. No conversational dyspnea. CARDIO: RRR, normal S1S2, no gallops or murmurs      ASSESSMENT AND PLAN:     1.  Bronchitis  As she has atrial fi

## 2018-12-27 ENCOUNTER — TELEPHONE (OUTPATIENT)
Dept: INTERNAL MEDICINE CLINIC | Facility: CLINIC | Age: 83
End: 2018-12-27

## 2018-12-27 ENCOUNTER — HOSPITAL ENCOUNTER (OUTPATIENT)
Dept: GENERAL RADIOLOGY | Age: 83
Discharge: HOME | End: 2018-12-27
Attending: INTERNAL MEDICINE
Payer: MEDICARE

## 2018-12-27 DIAGNOSIS — R05.9 COUGH: ICD-10-CM

## 2018-12-27 DIAGNOSIS — J40 BRONCHITIS: ICD-10-CM

## 2018-12-27 PROCEDURE — 71046 X-RAY EXAM CHEST 2 VIEWS: CPT | Performed by: INTERNAL MEDICINE

## 2018-12-27 RX ORDER — ALBUTEROL SULFATE 90 UG/1
2 AEROSOL, METERED RESPIRATORY (INHALATION) EVERY 6 HOURS PRN
Qty: 1 INHALER | Refills: 3 | Status: SHIPPED | OUTPATIENT
Start: 2018-12-27 | End: 2019-09-17 | Stop reason: ALTCHOICE

## 2018-12-27 NOTE — TELEPHONE ENCOUNTER
Please call pt daughter Ilsa Castro not able to fill inhaler, note sent to office  Daughter will be taking pt out in a bit to get xray, would like to  RX at the same time  Please call Jalil Flores when issue resolved  [employer] gave note to Walt Knight

## 2018-12-28 ENCOUNTER — TELEPHONE (OUTPATIENT)
Dept: INTERNAL MEDICINE CLINIC | Facility: CLINIC | Age: 83
End: 2018-12-28

## 2018-12-28 ENCOUNTER — OFFICE VISIT (OUTPATIENT)
Dept: INTERNAL MEDICINE CLINIC | Facility: CLINIC | Age: 83
End: 2018-12-28
Payer: MEDICARE

## 2018-12-28 ENCOUNTER — PRIOR ORIGINAL RECORDS (OUTPATIENT)
Dept: OTHER | Age: 83
End: 2018-12-28

## 2018-12-28 ENCOUNTER — LAB ENCOUNTER (OUTPATIENT)
Dept: LAB | Age: 83
End: 2018-12-28
Attending: INTERNAL MEDICINE
Payer: MEDICARE

## 2018-12-28 VITALS
BODY MASS INDEX: 22 KG/M2 | WEIGHT: 127 LBS | SYSTOLIC BLOOD PRESSURE: 168 MMHG | OXYGEN SATURATION: 98 % | DIASTOLIC BLOOD PRESSURE: 88 MMHG | TEMPERATURE: 98 F | HEART RATE: 67 BPM

## 2018-12-28 DIAGNOSIS — I10 ESSENTIAL HYPERTENSION: Primary | ICD-10-CM

## 2018-12-28 DIAGNOSIS — I48.91 ATRIAL FIBRILLATION, UNSPECIFIED TYPE (HCC): ICD-10-CM

## 2018-12-28 DIAGNOSIS — R60.0 LOWER EXTREMITY EDEMA: ICD-10-CM

## 2018-12-28 DIAGNOSIS — I50.31 ACUTE CONGESTIVE HEART FAILURE WITH LEFT VENTRICULAR DIASTOLIC DYSFUNCTION (HCC): ICD-10-CM

## 2018-12-28 DIAGNOSIS — I10 ESSENTIAL HYPERTENSION: ICD-10-CM

## 2018-12-28 DIAGNOSIS — I07.1 TRICUSPID VALVE INSUFFICIENCY, UNSPECIFIED ETIOLOGY: ICD-10-CM

## 2018-12-28 DIAGNOSIS — J40 BRONCHITIS: Primary | ICD-10-CM

## 2018-12-28 LAB
BILIRUBIN TOTAL: 0.6 MG/DL
BNP: 571 PMOL/L
BUN: 18 MG/DL
CALCIUM: 9.3 MG/DL
CHLORIDE: 96 MEQ/L
CREATININE, SERUM: 0.94 MG/DL
GLUCOSE: 104 MG/DL
HEMATOCRIT: 39.6 %
HEMOGLOBIN: 12.9 G/DL
PLATELETS: 186 K/UL
POTASSIUM, SERUM: 3.9 MEQ/L
PROTEIN, TOTAL: 6.5 G/DL
RED BLOOD COUNT: 4.26 X 10-6/U
SGOT (AST): 64 IU/L
SGPT (ALT): 41 IU/L
SODIUM: 137 MEQ/L
WHITE BLOOD COUNT: 7.2 X 10-3/U

## 2018-12-28 PROCEDURE — 36415 COLL VENOUS BLD VENIPUNCTURE: CPT

## 2018-12-28 PROCEDURE — 85025 COMPLETE CBC W/AUTO DIFF WBC: CPT

## 2018-12-28 PROCEDURE — 93005 ELECTROCARDIOGRAM TRACING: CPT | Performed by: INTERNAL MEDICINE

## 2018-12-28 PROCEDURE — 80053 COMPREHEN METABOLIC PANEL: CPT

## 2018-12-28 PROCEDURE — 99214 OFFICE O/P EST MOD 30 MIN: CPT | Performed by: INTERNAL MEDICINE

## 2018-12-28 PROCEDURE — 93010 ELECTROCARDIOGRAM REPORT: CPT | Performed by: INTERNAL MEDICINE

## 2018-12-28 PROCEDURE — 83880 ASSAY OF NATRIURETIC PEPTIDE: CPT

## 2018-12-28 PROCEDURE — G0463 HOSPITAL OUTPT CLINIC VISIT: HCPCS | Performed by: INTERNAL MEDICINE

## 2018-12-28 RX ORDER — FUROSEMIDE 20 MG/1
TABLET ORAL
Qty: 30 TABLET | Refills: 0 | Status: SHIPPED | OUTPATIENT
Start: 2018-12-28 | End: 2019-01-24

## 2018-12-28 RX ORDER — DOXYCYCLINE HYCLATE 100 MG/1
100 CAPSULE ORAL 2 TIMES DAILY
Qty: 14 CAPSULE | Refills: 0 | Status: SHIPPED | OUTPATIENT
Start: 2018-12-28 | End: 2019-03-11

## 2018-12-28 NOTE — PROGRESS NOTES
Cinthia Chin is a 80year old female. HPI:   Patient presents with:  Chest Congestion  Cough     Patient here for follow-up. She was seen 2 days ago. At that time she had runny nose with chest congestion and nonproductive cough.   Started getting sick ab HYDROCHLOROTHIAZIDE 12.5 MG Oral Tab TAKE 1 TABLET (12.5 MG TOTAL) BY MOUTH DAILY. Disp: 30 tablet Rfl: 11   lisinopril 5 MG Oral Tab Take 5 mg by mouth.  Take 2 tablets every morning and  2  tablet every evening  Disp:  Rfl:    METOPROLOL TARTRATE 25 MG Types: 1 Glasses of wine per week      Comment: daily    Drug use: No       REVIEW OF SYSTEMS:   GENERAL HEALTH:  feels well otherwise  RESPIRATORY:  Voices no shortness of breath with exertion or cough now  CARDIOVASCULAR:  Voices no chest pain on exertio moderate to severe tricuspid regurgitation. This may be entering into her pulmonary or lower extremity edema. - CBC WITH DIFFERENTIAL WITH PLATELET; Future  - COMP METABOLIC PANEL (14); Future  - BNP (B TYPE NATRIUERTIC PEPTIDE); Future    4.  Lower extre

## 2018-12-28 NOTE — TELEPHONE ENCOUNTER
Please notify patient that her labs look acceptable. Kidneys mildly weak. One blood test for her heart came little bit elevated so I think there may be an element of what is called congestive heart failure. That is when she is on her Lasix.   When she go

## 2018-12-31 NOTE — TELEPHONE ENCOUNTER
Pt's daughter notified  Labs look acceptable  Kidneys mildly weak  One blood test for heart mildly elevated / DR MAHMOOD  Pt takes Lasix 20 daily   To see DR Clemens Shone Friday  To get BMP before appt  Order in    To call for appt with DR MAHMOOD for week of Jan 7/ DR ELA Flores

## 2019-01-02 ENCOUNTER — APPOINTMENT (OUTPATIENT)
Dept: LAB | Facility: HOSPITAL | Age: 84
End: 2019-01-02
Attending: INTERNAL MEDICINE
Payer: MEDICARE

## 2019-01-02 DIAGNOSIS — I10 ESSENTIAL HYPERTENSION: ICD-10-CM

## 2019-01-02 LAB
ANION GAP SERPL CALC-SCNC: 10 MMOL/L (ref 0–18)
BUN SERPL-MCNC: 23 MG/DL (ref 8–20)
BUN/CREAT SERPL: 22.8 (ref 10–20)
CALCIUM SERPL-MCNC: 9.1 MG/DL (ref 8.5–10.5)
CHLORIDE SERPL-SCNC: 97 MMOL/L (ref 95–110)
CO2 SERPL-SCNC: 32 MMOL/L (ref 22–32)
CREAT SERPL-MCNC: 1.01 MG/DL (ref 0.5–1.5)
GLUCOSE SERPL-MCNC: 83 MG/DL (ref 70–99)
OSMOLALITY UR CALC.SUM OF ELEC: 291 MOSM/KG (ref 275–295)
POTASSIUM SERPL-SCNC: 4.1 MMOL/L (ref 3.3–5.1)
SODIUM SERPL-SCNC: 139 MMOL/L (ref 136–144)

## 2019-01-02 PROCEDURE — 80048 BASIC METABOLIC PNL TOTAL CA: CPT

## 2019-01-02 PROCEDURE — 36415 COLL VENOUS BLD VENIPUNCTURE: CPT

## 2019-01-02 NOTE — TELEPHONE ENCOUNTER
[facility] at [facility]. No order in the system. BMP order entered. Lab notified.

## 2019-01-04 ENCOUNTER — PRIOR ORIGINAL RECORDS (OUTPATIENT)
Dept: OTHER | Age: 84
End: 2019-01-04

## 2019-01-11 ENCOUNTER — APPOINTMENT (OUTPATIENT)
Dept: LAB | Age: 84
End: 2019-01-11
Attending: INTERNAL MEDICINE
Payer: MEDICARE

## 2019-01-11 ENCOUNTER — OFFICE VISIT (OUTPATIENT)
Dept: INTERNAL MEDICINE CLINIC | Facility: CLINIC | Age: 84
End: 2019-01-11
Payer: MEDICARE

## 2019-01-11 ENCOUNTER — TELEPHONE (OUTPATIENT)
Dept: INTERNAL MEDICINE CLINIC | Facility: CLINIC | Age: 84
End: 2019-01-11

## 2019-01-11 VITALS
SYSTOLIC BLOOD PRESSURE: 136 MMHG | OXYGEN SATURATION: 96 % | WEIGHT: 131 LBS | HEART RATE: 69 BPM | DIASTOLIC BLOOD PRESSURE: 90 MMHG | BODY MASS INDEX: 22.36 KG/M2 | HEIGHT: 64 IN | TEMPERATURE: 98 F | RESPIRATION RATE: 18 BRPM

## 2019-01-11 DIAGNOSIS — I07.1 TRICUSPID VALVE INSUFFICIENCY, UNSPECIFIED ETIOLOGY: ICD-10-CM

## 2019-01-11 DIAGNOSIS — I10 ESSENTIAL HYPERTENSION: Primary | ICD-10-CM

## 2019-01-11 DIAGNOSIS — R74.8 ELEVATED ALKALINE PHOSPHATASE LEVEL: ICD-10-CM

## 2019-01-11 DIAGNOSIS — I10 ESSENTIAL HYPERTENSION: ICD-10-CM

## 2019-01-11 DIAGNOSIS — I48.91 ATRIAL FIBRILLATION, UNSPECIFIED TYPE (HCC): ICD-10-CM

## 2019-01-11 DIAGNOSIS — R93.89 ABNORMAL CT OF THE CHEST: ICD-10-CM

## 2019-01-11 DIAGNOSIS — R60.0 LOWER EXTREMITY EDEMA: ICD-10-CM

## 2019-01-11 LAB
ALBUMIN SERPL BCP-MCNC: 3.3 G/DL (ref 3.5–4.8)
ALBUMIN/GLOB SERPL: 1.1 {RATIO} (ref 1–2)
ALP SERPL-CCNC: 114 U/L (ref 32–100)
ALT SERPL-CCNC: 19 U/L (ref 14–54)
ANION GAP SERPL CALC-SCNC: 9 MMOL/L (ref 0–18)
AST SERPL-CCNC: 33 U/L (ref 15–41)
BILIRUB SERPL-MCNC: 0.6 MG/DL (ref 0.3–1.2)
BUN SERPL-MCNC: 16 MG/DL (ref 8–20)
BUN/CREAT SERPL: 21.3 (ref 10–20)
CALCIUM SERPL-MCNC: 9 MG/DL (ref 8.5–10.5)
CHLORIDE SERPL-SCNC: 101 MMOL/L (ref 95–110)
CO2 SERPL-SCNC: 29 MMOL/L (ref 22–32)
CREAT SERPL-MCNC: 0.75 MG/DL (ref 0.5–1.5)
GLOBULIN PLAS-MCNC: 3 G/DL (ref 2.5–3.7)
GLUCOSE SERPL-MCNC: 87 MG/DL (ref 70–99)
OSMOLALITY UR CALC.SUM OF ELEC: 289 MOSM/KG (ref 275–295)
PATIENT FASTING: NO
POTASSIUM SERPL-SCNC: 4.7 MMOL/L (ref 3.3–5.1)
PROT SERPL-MCNC: 6.3 G/DL (ref 5.9–8.4)
SODIUM SERPL-SCNC: 139 MMOL/L (ref 136–144)

## 2019-01-11 PROCEDURE — 80053 COMPREHEN METABOLIC PANEL: CPT

## 2019-01-11 PROCEDURE — 36415 COLL VENOUS BLD VENIPUNCTURE: CPT

## 2019-01-11 PROCEDURE — G0463 HOSPITAL OUTPT CLINIC VISIT: HCPCS | Performed by: INTERNAL MEDICINE

## 2019-01-11 PROCEDURE — 99214 OFFICE O/P EST MOD 30 MIN: CPT | Performed by: INTERNAL MEDICINE

## 2019-01-11 NOTE — TELEPHONE ENCOUNTER
Please let patient know that her labs from Friday look good. Electrolytes good. I am very happy with her lab results. Even liver enzyme numbers are improved. We will see her in 2 months.

## 2019-01-11 NOTE — PROGRESS NOTES
Romayne Ponder is a 80year old female. HPI:   Patient presents with:  Cough: Patient states that she is doing well- cough has stopped     Patient feels much better. Her cough has resolved. She did see Dr. Oscar Stubbs.     She indicates that he agreed with Medications:  Doxycycline Hyclate 100 MG Oral Cap Take 1 capsule (100 mg total) by mouth 2 (two) times daily.  Disp: 14 capsule Rfl: 0   furosemide (LASIX) 20 MG Oral Tab Take one tablet a day Disp: 30 tablet Rfl: 0   HYDROCHLOROTHIAZIDE 12.5 MG Oral Tab TA disease    • Hiatal hernia     (as per NG)   • High blood pressure    • Lipid screening 04/03/2012    (as per NG)   • Osteoarthrosis, unspecified whether generalized or localized, unspecified site     (as per NG)   • Osteoporosis screening 02/23/2009    Jillian Ortiz feel patient most likely has diastolic dysfunction even though not shown on echocardiogram.  She seems to be doing well with current medication regime. Will continue. 3. Lower extremity edema  Encourage lower extremity support hose.     4. Atrial fibril

## 2019-01-14 NOTE — TELEPHONE ENCOUNTER
I relayed Dr. Belle Lehman message to patient. She verbalized understanding and says she already has her follow up appointment scheduled.

## 2019-01-24 RX ORDER — FUROSEMIDE 20 MG/1
TABLET ORAL
Qty: 30 TABLET | Refills: 11 | Status: SHIPPED | OUTPATIENT
Start: 2019-01-24 | End: 2020-02-26

## 2019-01-24 NOTE — TELEPHONE ENCOUNTER
Kwaku swanson to keep refilling the furosemide for patient?  It was started at the 12/28/18 office visit and only sent in for a 30-day supply

## 2019-02-05 ENCOUNTER — MYAURORA ACCOUNT LINK (OUTPATIENT)
Dept: OTHER | Age: 84
End: 2019-02-05

## 2019-02-06 ENCOUNTER — PRIOR ORIGINAL RECORDS (OUTPATIENT)
Dept: OTHER | Age: 84
End: 2019-02-06

## 2019-02-08 ENCOUNTER — PRIOR ORIGINAL RECORDS (OUTPATIENT)
Dept: OTHER | Age: 84
End: 2019-02-08

## 2019-02-28 VITALS
HEIGHT: 62 IN | WEIGHT: 117 LBS | OXYGEN SATURATION: 91 % | SYSTOLIC BLOOD PRESSURE: 170 MMHG | BODY MASS INDEX: 21.53 KG/M2 | HEART RATE: 85 BPM | DIASTOLIC BLOOD PRESSURE: 90 MMHG

## 2019-03-11 ENCOUNTER — LAB ENCOUNTER (OUTPATIENT)
Dept: LAB | Age: 84
End: 2019-03-11
Attending: INTERNAL MEDICINE
Payer: MEDICARE

## 2019-03-11 ENCOUNTER — TELEPHONE (OUTPATIENT)
Dept: INTERNAL MEDICINE CLINIC | Facility: CLINIC | Age: 84
End: 2019-03-11

## 2019-03-11 ENCOUNTER — OFFICE VISIT (OUTPATIENT)
Dept: INTERNAL MEDICINE CLINIC | Facility: CLINIC | Age: 84
End: 2019-03-11
Payer: MEDICARE

## 2019-03-11 VITALS
WEIGHT: 121.38 LBS | HEART RATE: 68 BPM | OXYGEN SATURATION: 93 % | RESPIRATION RATE: 18 BRPM | TEMPERATURE: 98 F | SYSTOLIC BLOOD PRESSURE: 140 MMHG | BODY MASS INDEX: 20.22 KG/M2 | DIASTOLIC BLOOD PRESSURE: 82 MMHG | HEIGHT: 65 IN

## 2019-03-11 DIAGNOSIS — R93.1 DECREASED CARDIAC EJECTION FRACTION: ICD-10-CM

## 2019-03-11 DIAGNOSIS — I48.91 ATRIAL FIBRILLATION, UNSPECIFIED TYPE (HCC): ICD-10-CM

## 2019-03-11 DIAGNOSIS — R93.89 ABNORMAL CT OF THE CHEST: ICD-10-CM

## 2019-03-11 DIAGNOSIS — I38 VALVULAR REGURGITATION: ICD-10-CM

## 2019-03-11 DIAGNOSIS — I10 ESSENTIAL HYPERTENSION: ICD-10-CM

## 2019-03-11 DIAGNOSIS — R74.8 ELEVATED ALKALINE PHOSPHATASE LEVEL: ICD-10-CM

## 2019-03-11 DIAGNOSIS — R60.0 LOWER EXTREMITY EDEMA: ICD-10-CM

## 2019-03-11 DIAGNOSIS — I10 ESSENTIAL HYPERTENSION: Primary | ICD-10-CM

## 2019-03-11 LAB
ALBUMIN SERPL-MCNC: 3.4 G/DL (ref 3.4–5)
ALBUMIN/GLOB SERPL: 0.9 {RATIO} (ref 1–2)
ALP LIVER SERPL-CCNC: 162 U/L (ref 55–142)
ALT SERPL-CCNC: 27 U/L (ref 13–56)
ANION GAP SERPL CALC-SCNC: 4 MMOL/L (ref 0–18)
AST SERPL-CCNC: 44 U/L (ref 15–37)
BASOPHILS # BLD AUTO: 0.05 X10(3) UL (ref 0–0.2)
BASOPHILS NFR BLD AUTO: 0.7 %
BILIRUB SERPL-MCNC: 0.8 MG/DL (ref 0.1–2)
BUN BLD-MCNC: 18 MG/DL (ref 7–18)
BUN/CREAT SERPL: 19.6 (ref 10–20)
CALCIUM BLD-MCNC: 9.1 MG/DL (ref 8.5–10.1)
CHLORIDE SERPL-SCNC: 106 MMOL/L (ref 98–107)
CO2 SERPL-SCNC: 31 MMOL/L (ref 21–32)
CREAT BLD-MCNC: 0.92 MG/DL (ref 0.55–1.02)
DEPRECATED RDW RBC AUTO: 58.3 FL (ref 35.1–46.3)
EOSINOPHIL # BLD AUTO: 0.12 X10(3) UL (ref 0–0.7)
EOSINOPHIL NFR BLD AUTO: 1.6 %
ERYTHROCYTE [DISTWIDTH] IN BLOOD BY AUTOMATED COUNT: 16.3 % (ref 11–15)
GGT SERPL-CCNC: 13 U/L (ref 5–55)
GLOBULIN PLAS-MCNC: 3.9 G/DL (ref 2.8–4.4)
GLUCOSE BLD-MCNC: 92 MG/DL (ref 70–99)
HCT VFR BLD AUTO: 41.4 % (ref 35–48)
HGB BLD-MCNC: 13.1 G/DL (ref 12–16)
IMM GRANULOCYTES # BLD AUTO: 0.03 X10(3) UL (ref 0–1)
IMM GRANULOCYTES NFR BLD: 0.4 %
LYMPHOCYTES # BLD AUTO: 0.82 X10(3) UL (ref 1–4)
LYMPHOCYTES NFR BLD AUTO: 11.1 %
M PROTEIN MFR SERPL ELPH: 7.3 G/DL (ref 6.4–8.2)
MCH RBC QN AUTO: 30.7 PG (ref 26–34)
MCHC RBC AUTO-ENTMCNC: 31.6 G/DL (ref 31–37)
MCV RBC AUTO: 97 FL (ref 80–100)
MONOCYTES # BLD AUTO: 0.83 X10(3) UL (ref 0.1–1)
MONOCYTES NFR BLD AUTO: 11.3 %
NEUTROPHILS # BLD AUTO: 5.52 X10 (3) UL (ref 1.5–7.7)
NEUTROPHILS # BLD AUTO: 5.52 X10(3) UL (ref 1.5–7.7)
NEUTROPHILS NFR BLD AUTO: 74.9 %
OSMOLALITY SERPL CALC.SUM OF ELEC: 294 MOSM/KG (ref 275–295)
PLATELET # BLD AUTO: 185 10(3)UL (ref 150–450)
POTASSIUM SERPL-SCNC: 4.8 MMOL/L (ref 3.5–5.1)
RBC # BLD AUTO: 4.27 X10(6)UL (ref 3.8–5.3)
SODIUM SERPL-SCNC: 141 MMOL/L (ref 136–145)
WBC # BLD AUTO: 7.4 X10(3) UL (ref 4–11)

## 2019-03-11 PROCEDURE — 99214 OFFICE O/P EST MOD 30 MIN: CPT | Performed by: INTERNAL MEDICINE

## 2019-03-11 PROCEDURE — 82977 ASSAY OF GGT: CPT

## 2019-03-11 PROCEDURE — 36415 COLL VENOUS BLD VENIPUNCTURE: CPT

## 2019-03-11 PROCEDURE — 85025 COMPLETE CBC W/AUTO DIFF WBC: CPT

## 2019-03-11 PROCEDURE — G0463 HOSPITAL OUTPT CLINIC VISIT: HCPCS | Performed by: INTERNAL MEDICINE

## 2019-03-11 PROCEDURE — 80053 COMPREHEN METABOLIC PANEL: CPT

## 2019-03-11 NOTE — TELEPHONE ENCOUNTER
Please let patient know that her labs came out acceptable. She has 2 liver enzymes that are a little bit elevated. These have been elevated in the past.  For now I do not think any further testing needs to be done. She had a liver ultrasound last year.

## 2019-03-11 NOTE — PROGRESS NOTES
HPI:   Gris Granados is a 80year old female presents with the following problems. Patient feels well. She has no complaints. She did see Dr. Blessing Peterson. I did review her echocardiogram report done in February. Echocardiogram showed ejection fraction 45%. mass index is 20.2 kg/m². Current Outpatient Medications:  furosemide (LASIX) 20 MG Oral Tab Take one tablet a day Disp: 30 tablet Rfl: 11   Doxycycline Hyclate 100 MG Oral Cap Take 1 capsule (100 mg total) by mouth 2 (two) times daily.  Disp: 14 caps [facility])     Right Femur fractured (as per NG)   • Heart disease    • Hiatal hernia     (as per NG)   • High blood pressure    • Lipid screening 04/03/2012    (as per NG)   • Osteoarthrosis, unspecified whether generalized or localized, unspecified site     ( wine per week      Comment: daily    Drug use: No         REVIEW OF SYSTEMS:   GENERAL:  feels well. No acute distress.    EYES: Voices no blurred vision or eye pain  HEENT:  Voices no nasal congestion, sinus pain or sore throat  LUNGS:  Voices no shortnes Atrial fibrillation, unspecified type ([address]  Rate controlled. On Eliquis. 4. Elevated alkaline phosphatase level  Etiology unclear. May be due to the sclerotic bone lesion noted on CAT scan. She did have a unremarkable liver ultrasound.   Will monitor

## 2019-03-12 NOTE — TELEPHONE ENCOUNTER
Spoke with Demarco Bhatt to relay MD message below. She voiced understanding and will repeat labs in 3 months. She also checked her pill bottles to make sure she is taking Furosemide which she confirmed she was indeed.  Hydrochlorothiazide was D/C'd off Med List as

## 2019-04-15 ENCOUNTER — OFFICE VISIT (OUTPATIENT)
Dept: DERMATOLOGY CLINIC | Facility: CLINIC | Age: 84
End: 2019-04-15
Payer: MEDICARE

## 2019-04-15 DIAGNOSIS — Z85.828 HISTORY OF SKIN CANCER: ICD-10-CM

## 2019-04-15 DIAGNOSIS — D48.5 NEOPLASM OF UNCERTAIN BEHAVIOR OF SKIN: Primary | ICD-10-CM

## 2019-04-15 DIAGNOSIS — L57.0 AK (ACTINIC KERATOSIS): ICD-10-CM

## 2019-04-15 DIAGNOSIS — D23.30 BENIGN NEOPLASM OF SKIN OF FACE: ICD-10-CM

## 2019-04-15 DIAGNOSIS — D23.4 BENIGN NEOPLASM OF SCALP AND SKIN OF NECK: ICD-10-CM

## 2019-04-15 DIAGNOSIS — L82.1 SEBORRHEIC KERATOSES: ICD-10-CM

## 2019-04-15 DIAGNOSIS — D23.60 BENIGN NEOPLASM OF SKIN OF UPPER LIMB, INCLUDING SHOULDER, UNSPECIFIED LATERALITY: ICD-10-CM

## 2019-04-15 PROCEDURE — 17003 DESTRUCT PREMALG LES 2-14: CPT | Performed by: DERMATOLOGY

## 2019-04-15 PROCEDURE — G0463 HOSPITAL OUTPT CLINIC VISIT: HCPCS | Performed by: DERMATOLOGY

## 2019-04-15 PROCEDURE — 88305 TISSUE EXAM BY PATHOLOGIST: CPT | Performed by: DERMATOLOGY

## 2019-04-15 PROCEDURE — 11102 TANGNTL BX SKIN SINGLE LES: CPT | Performed by: DERMATOLOGY

## 2019-04-15 PROCEDURE — 17000 DESTRUCT PREMALG LESION: CPT | Performed by: DERMATOLOGY

## 2019-04-15 PROCEDURE — 99213 OFFICE O/P EST LOW 20 MIN: CPT | Performed by: DERMATOLOGY

## 2019-04-15 NOTE — PROGRESS NOTES
Base e&c'ed 5mm          Operative Report                     Shave/  Tangential biopsy     Clinical diagnosis:  Rule out SCC versus hypertrophic AK pathology hypertrophic AK    Size of lesion:  5 mm  Location:  Right nasal ala  Procedure:     With patient

## 2019-04-22 NOTE — PROGRESS NOTES
Results logged in. Patient informed of test results and all KMT's recommendations. Voiced understanding.

## 2019-04-22 NOTE — PROGRESS NOTES
The pathology report from last visit showed   a hypertrophic AK. Lesion treated with electrodesiccation curettage at visit. No further surgical treatment needed. Patient with numerous skin cancers continue careful follow-up. Please log in test results.
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
## 2019-05-13 ENCOUNTER — OFFICE VISIT (OUTPATIENT)
Dept: INTERNAL MEDICINE CLINIC | Facility: CLINIC | Age: 84
End: 2019-05-13
Payer: MEDICARE

## 2019-05-13 ENCOUNTER — TELEPHONE (OUTPATIENT)
Dept: INTERNAL MEDICINE CLINIC | Facility: CLINIC | Age: 84
End: 2019-05-13

## 2019-05-13 VITALS
RESPIRATION RATE: 12 BRPM | HEART RATE: 67 BPM | DIASTOLIC BLOOD PRESSURE: 70 MMHG | WEIGHT: 125 LBS | SYSTOLIC BLOOD PRESSURE: 112 MMHG | OXYGEN SATURATION: 98 % | TEMPERATURE: 98 F | BODY MASS INDEX: 21 KG/M2

## 2019-05-13 DIAGNOSIS — S51.812A SKIN TEAR OF LEFT FOREARM WITHOUT COMPLICATION, INITIAL ENCOUNTER: Primary | ICD-10-CM

## 2019-05-13 DIAGNOSIS — I48.91 ATRIAL FIBRILLATION, UNSPECIFIED TYPE (HCC): ICD-10-CM

## 2019-05-13 DIAGNOSIS — S81.801A WOUND OF RIGHT LEG, INITIAL ENCOUNTER: ICD-10-CM

## 2019-05-13 DIAGNOSIS — I10 ESSENTIAL HYPERTENSION: ICD-10-CM

## 2019-05-13 PROCEDURE — G0463 HOSPITAL OUTPT CLINIC VISIT: HCPCS | Performed by: INTERNAL MEDICINE

## 2019-05-13 PROCEDURE — 99214 OFFICE O/P EST MOD 30 MIN: CPT | Performed by: INTERNAL MEDICINE

## 2019-05-13 NOTE — PROGRESS NOTES
Romayne Ponder is a 80year old female. HPI:   Patient presents with: Injury: Pt states Friday she slammed her L FA in a sliding door and has a skin tear. Family member reports pt has had difficulty sleeping since injury. As per nursing documentation. the lungs every 6 (six) hours as needed for Wheezing.  Disp: 1 Inhaler Rfl: 3      Past Medical History:   Diagnosis Date   • Atrial fibrillation (HCC)     Paroxysmal Atrial Fibrillation; Treated via Coumadin (as per NG)    • Basal cell carcinoma 12/08/2017 auscultation. Effort normal  CARDIO:  RRR without murmur. S1 and S2 normal  GI:  good BS's,  no masses,   HSM or tenderness  EXTREMITIES : Left forearm there is a triangular 1 cm skin tear but the flap is adherent and looks like it will survive.   No Saman Kobs

## 2019-05-13 NOTE — TELEPHONE ENCOUNTER
Spoke with patient. She cut herself on Friday. It is dry now, not oozing. Daughter dressed it with bandage and gauze but would like her to get it checked out to see if she needs any special ointment. Patient opened the sliding door into her left arm.  She

## 2019-05-13 NOTE — TELEPHONE ENCOUNTER
Please call patient and let her know that a cut is better managed in the ER. They would have better capabilities than I would of managing her cut. Especially if it is oozing.   She may need some treatment in the emergency room that I am not able to give

## 2019-05-30 RX ORDER — LISINOPRIL 5 MG/1
TABLET ORAL
Qty: 60 TABLET | Refills: 11 | Status: ON HOLD | OUTPATIENT
Start: 2019-05-30 | End: 2020-01-09

## 2019-05-30 NOTE — TELEPHONE ENCOUNTER
Please advise on pending RX - called patient who states she is only taking 2 tablets ( 10mg)Lisinopril in the morning- to DR. MAHMOOD

## 2019-05-30 NOTE — TELEPHONE ENCOUNTER
Since patient taking only 2 of the 5 mg lisinopril's a day and blood pressure last time was good we will keep dose at what she is taking now. Lisinopril 5 mg 2 tablets every day. I sent over a refill.

## 2019-06-11 ENCOUNTER — APPOINTMENT (OUTPATIENT)
Dept: LAB | Age: 84
End: 2019-06-11
Attending: INTERNAL MEDICINE
Payer: MEDICARE

## 2019-06-11 ENCOUNTER — OFFICE VISIT (OUTPATIENT)
Dept: INTERNAL MEDICINE CLINIC | Facility: CLINIC | Age: 84
End: 2019-06-11
Payer: MEDICARE

## 2019-06-11 ENCOUNTER — HOSPITAL ENCOUNTER (OUTPATIENT)
Dept: GENERAL RADIOLOGY | Age: 84
Discharge: HOME | End: 2019-06-11
Attending: INTERNAL MEDICINE
Payer: MEDICARE

## 2019-06-11 ENCOUNTER — TELEPHONE (OUTPATIENT)
Dept: INTERNAL MEDICINE CLINIC | Facility: CLINIC | Age: 84
End: 2019-06-11

## 2019-06-11 VITALS
WEIGHT: 122 LBS | DIASTOLIC BLOOD PRESSURE: 80 MMHG | OXYGEN SATURATION: 98 % | RESPIRATION RATE: 18 BRPM | BODY MASS INDEX: 20.83 KG/M2 | HEIGHT: 64 IN | SYSTOLIC BLOOD PRESSURE: 140 MMHG | HEART RATE: 63 BPM | TEMPERATURE: 98 F

## 2019-06-11 DIAGNOSIS — R74.8 ELEVATED ALKALINE PHOSPHATASE LEVEL: ICD-10-CM

## 2019-06-11 DIAGNOSIS — M25.511 CHRONIC RIGHT SHOULDER PAIN: ICD-10-CM

## 2019-06-11 DIAGNOSIS — R60.0 LOWER EXTREMITY EDEMA: ICD-10-CM

## 2019-06-11 DIAGNOSIS — I48.91 ATRIAL FIBRILLATION, UNSPECIFIED TYPE (HCC): ICD-10-CM

## 2019-06-11 DIAGNOSIS — L98.9 SKIN SORE: ICD-10-CM

## 2019-06-11 DIAGNOSIS — I10 ESSENTIAL HYPERTENSION: Primary | ICD-10-CM

## 2019-06-11 DIAGNOSIS — G89.29 CHRONIC RIGHT SHOULDER PAIN: ICD-10-CM

## 2019-06-11 PROCEDURE — 73030 X-RAY EXAM OF SHOULDER: CPT | Performed by: INTERNAL MEDICINE

## 2019-06-11 PROCEDURE — 84080 ASSAY ALKALINE PHOSPHATASES: CPT

## 2019-06-11 PROCEDURE — 99214 OFFICE O/P EST MOD 30 MIN: CPT | Performed by: INTERNAL MEDICINE

## 2019-06-11 PROCEDURE — G0463 HOSPITAL OUTPT CLINIC VISIT: HCPCS | Performed by: INTERNAL MEDICINE

## 2019-06-11 PROCEDURE — 84075 ASSAY ALKALINE PHOSPHATASE: CPT

## 2019-06-11 PROCEDURE — 36415 COLL VENOUS BLD VENIPUNCTURE: CPT

## 2019-06-11 PROCEDURE — 80053 COMPREHEN METABOLIC PANEL: CPT

## 2019-06-11 NOTE — PROGRESS NOTES
Leyda Herrera is a 80year old female. HPI:   Patient presents with:  Checkup: 3 month   Shoulder Pain: Pt continues with right shoulder pain 8/10. Swelling: Patient c/o swelling to both hands and legs. Denies pain. As per nursing documentation. 1 Bottle Rfl: 0   Cyanocobalamin (VITAMIN B 12 OR) Take by mouth daily. Disp:  Rfl:    Calcium Carbonate-Vit D-Min (CALCIUM 600 + MINERALS OR) Take 1 tablet by mouth daily.  Disp:  Rfl:    acetaminophen 325 MG Oral Tab Take 325 mg by mouth every 6 (six) h urning or frequency of urination.   NEURO:  Voices no  headaches or dizziness    EXAM:   /80   Pulse 63   Temp 97.6 °F (36.4 °C) (Oral)   Resp 18   Ht 5' 4\" (1.626 m)   Wt 122 lb (55.3 kg)   SpO2 98%   BMI 20.94 kg/m²     GENERAL:  well developed, we

## 2019-06-11 NOTE — TELEPHONE ENCOUNTER
Please let patient know that her right shoulder x-ray did not come out too bad. No major arthritis. I am not sure why she should be having pain but it may be something related to soft tissue or rotator cuff.   I did give her contact information to orthope

## 2019-06-11 NOTE — TELEPHONE ENCOUNTER
Patient called and relayed Dr Yvette Sigala message. Pt still has Dr Alecia Childs information and will schedule appointment for further evaluation. Patient verbalized understanding with no further questions noted.

## 2019-09-17 ENCOUNTER — OFFICE VISIT (OUTPATIENT)
Dept: INTERNAL MEDICINE CLINIC | Facility: CLINIC | Age: 84
End: 2019-09-17
Payer: MEDICARE

## 2019-09-17 ENCOUNTER — LAB ENCOUNTER (OUTPATIENT)
Dept: LAB | Age: 84
End: 2019-09-17
Attending: INTERNAL MEDICINE
Payer: MEDICARE

## 2019-09-17 VITALS
RESPIRATION RATE: 12 BRPM | TEMPERATURE: 98 F | OXYGEN SATURATION: 97 % | BODY MASS INDEX: 22 KG/M2 | HEART RATE: 89 BPM | WEIGHT: 126 LBS | DIASTOLIC BLOOD PRESSURE: 88 MMHG | SYSTOLIC BLOOD PRESSURE: 140 MMHG

## 2019-09-17 DIAGNOSIS — R74.8 ELEVATED ALKALINE PHOSPHATASE LEVEL: ICD-10-CM

## 2019-09-17 DIAGNOSIS — I10 ESSENTIAL HYPERTENSION: Primary | ICD-10-CM

## 2019-09-17 DIAGNOSIS — I48.91 ATRIAL FIBRILLATION, UNSPECIFIED TYPE (HCC): ICD-10-CM

## 2019-09-17 DIAGNOSIS — Z85.828 HISTORY OF SKIN CANCER: ICD-10-CM

## 2019-09-17 DIAGNOSIS — I10 ESSENTIAL HYPERTENSION: ICD-10-CM

## 2019-09-17 DIAGNOSIS — Z23 FLU VACCINE NEED: ICD-10-CM

## 2019-09-17 LAB
ALBUMIN SERPL-MCNC: 3.5 G/DL (ref 3.4–5)
ALBUMIN/GLOB SERPL: 0.9 {RATIO} (ref 1–2)
ALP LIVER SERPL-CCNC: 178 U/L (ref 55–142)
ALT SERPL-CCNC: 23 U/L (ref 13–56)
ANION GAP SERPL CALC-SCNC: 5 MMOL/L (ref 0–18)
AST SERPL-CCNC: 43 U/L (ref 15–37)
BASOPHILS # BLD AUTO: 0.05 X10(3) UL (ref 0–0.2)
BASOPHILS NFR BLD AUTO: 0.7 %
BILIRUB SERPL-MCNC: 0.6 MG/DL (ref 0.1–2)
BUN BLD-MCNC: 17 MG/DL (ref 7–18)
BUN/CREAT SERPL: 17.7 (ref 10–20)
CALCIUM BLD-MCNC: 9.3 MG/DL (ref 8.5–10.1)
CHLORIDE SERPL-SCNC: 106 MMOL/L (ref 98–112)
CO2 SERPL-SCNC: 31 MMOL/L (ref 21–32)
CREAT BLD-MCNC: 0.96 MG/DL (ref 0.55–1.02)
DEPRECATED RDW RBC AUTO: 55.8 FL (ref 35.1–46.3)
EOSINOPHIL # BLD AUTO: 0.08 X10(3) UL (ref 0–0.7)
EOSINOPHIL NFR BLD AUTO: 1 %
ERYTHROCYTE [DISTWIDTH] IN BLOOD BY AUTOMATED COUNT: 15.8 % (ref 11–15)
GLOBULIN PLAS-MCNC: 3.7 G/DL (ref 2.8–4.4)
GLUCOSE BLD-MCNC: 93 MG/DL (ref 70–99)
HCT VFR BLD AUTO: 38.5 % (ref 35–48)
HGB BLD-MCNC: 12.1 G/DL (ref 12–16)
IMM GRANULOCYTES # BLD AUTO: 0.02 X10(3) UL (ref 0–1)
IMM GRANULOCYTES NFR BLD: 0.3 %
LYMPHOCYTES # BLD AUTO: 0.71 X10(3) UL (ref 1–4)
LYMPHOCYTES NFR BLD AUTO: 9.3 %
M PROTEIN MFR SERPL ELPH: 7.2 G/DL (ref 6.4–8.2)
MCH RBC QN AUTO: 30 PG (ref 26–34)
MCHC RBC AUTO-ENTMCNC: 31.4 G/DL (ref 31–37)
MCV RBC AUTO: 95.5 FL (ref 80–100)
MONOCYTES # BLD AUTO: 0.58 X10(3) UL (ref 0.1–1)
MONOCYTES NFR BLD AUTO: 7.6 %
NEUTROPHILS # BLD AUTO: 6.18 X10 (3) UL (ref 1.5–7.7)
NEUTROPHILS # BLD AUTO: 6.18 X10(3) UL (ref 1.5–7.7)
NEUTROPHILS NFR BLD AUTO: 81.1 %
OSMOLALITY SERPL CALC.SUM OF ELEC: 295 MOSM/KG (ref 275–295)
PATIENT FASTING: NO
PLATELET # BLD AUTO: 224 10(3)UL (ref 150–450)
POTASSIUM SERPL-SCNC: 5 MMOL/L (ref 3.5–5.1)
RBC # BLD AUTO: 4.03 X10(6)UL (ref 3.8–5.3)
SODIUM SERPL-SCNC: 142 MMOL/L (ref 136–145)
WBC # BLD AUTO: 7.6 X10(3) UL (ref 4–11)

## 2019-09-17 PROCEDURE — 99214 OFFICE O/P EST MOD 30 MIN: CPT | Performed by: INTERNAL MEDICINE

## 2019-09-17 PROCEDURE — 36415 COLL VENOUS BLD VENIPUNCTURE: CPT

## 2019-09-17 PROCEDURE — G0463 HOSPITAL OUTPT CLINIC VISIT: HCPCS | Performed by: INTERNAL MEDICINE

## 2019-09-17 PROCEDURE — 80053 COMPREHEN METABOLIC PANEL: CPT

## 2019-09-17 PROCEDURE — G0008 ADMIN INFLUENZA VIRUS VAC: HCPCS | Performed by: INTERNAL MEDICINE

## 2019-09-17 PROCEDURE — 85025 COMPLETE CBC W/AUTO DIFF WBC: CPT

## 2019-09-17 PROCEDURE — 90662 IIV NO PRSV INCREASED AG IM: CPT | Performed by: INTERNAL MEDICINE

## 2019-09-17 NOTE — PROGRESS NOTES
Gris Granados is a 80year old female. HPI:   Patient presents with: Follow - Up: 3 month follow up. Pt reports she has been feeling well. Pt would like vaccine today; pended for review. As per nursing documentation    Patient feels well.   She has no D-Min (CALCIUM 600 + MINERALS OR) Take 1 tablet by mouth daily. Disp:  Rfl:    acetaminophen 325 MG Oral Tab Take 325 mg by mouth every 6 (six) hours as needed for Pain.  Disp:  Rfl:       Past Medical History:   Diagnosis Date   • [facility] rashes , no suspicious lesions  HEENT: atraumatic. Pharynx normal without exudate. EYES:  PERRL. Sclera anicteric. NECK:  Supple,  no adenopathy,    LUNGS:  clear to auscultation. Effort normal  CARDIO:  RRR without murmur.    S1 and S2 normal  GI:  go

## 2019-09-23 ENCOUNTER — TELEPHONE (OUTPATIENT)
Dept: INTERNAL MEDICINE CLINIC | Facility: CLINIC | Age: 84
End: 2019-09-23

## 2019-09-23 DIAGNOSIS — R74.8 ELEVATED LIVER ENZYMES: Primary | ICD-10-CM

## 2019-09-23 NOTE — TELEPHONE ENCOUNTER
Please notify patient that her recent labs from last week were fairly acceptable except one liver enzyme crept up a little bit higher than it had been in the past.  She had this before and we did a liver ultrasound last year.   Would be wise to repeat the l

## 2019-09-23 NOTE — TELEPHONE ENCOUNTER
Called patient and relayed DR. MAHMOOD message - verbalized understanding. NUmber for scheduling given to patient .

## 2019-10-14 ENCOUNTER — OFFICE VISIT (OUTPATIENT)
Dept: DERMATOLOGY CLINIC | Facility: CLINIC | Age: 84
End: 2019-10-14
Payer: MEDICARE

## 2019-10-14 DIAGNOSIS — D23.30 BENIGN NEOPLASM OF SKIN OF FACE: ICD-10-CM

## 2019-10-14 DIAGNOSIS — L82.1 SEBORRHEIC KERATOSES: ICD-10-CM

## 2019-10-14 DIAGNOSIS — L57.0 AK (ACTINIC KERATOSIS): Primary | ICD-10-CM

## 2019-10-14 DIAGNOSIS — Z85.828 HISTORY OF SKIN CANCER: ICD-10-CM

## 2019-10-14 DIAGNOSIS — D23.60 BENIGN NEOPLASM OF SKIN OF UPPER LIMB, INCLUDING SHOULDER, UNSPECIFIED LATERALITY: ICD-10-CM

## 2019-10-14 DIAGNOSIS — D23.4 BENIGN NEOPLASM OF SCALP AND SKIN OF NECK: ICD-10-CM

## 2019-10-14 PROCEDURE — 99213 OFFICE O/P EST LOW 20 MIN: CPT | Performed by: DERMATOLOGY

## 2019-10-14 PROCEDURE — 17003 DESTRUCT PREMALG LES 2-14: CPT | Performed by: DERMATOLOGY

## 2019-10-14 PROCEDURE — G0463 HOSPITAL OUTPT CLINIC VISIT: HCPCS | Performed by: DERMATOLOGY

## 2019-10-14 PROCEDURE — 17000 DESTRUCT PREMALG LESION: CPT | Performed by: DERMATOLOGY

## 2019-10-15 RX ORDER — APIXABAN 2.5 MG/1
TABLET, FILM COATED ORAL
Qty: 60 TABLET | Refills: 11 | Status: SHIPPED | OUTPATIENT
Start: 2019-10-15

## 2019-11-05 ENCOUNTER — OFFICE VISIT (OUTPATIENT)
Dept: INTERNAL MEDICINE CLINIC | Facility: CLINIC | Age: 84
End: 2019-11-05
Payer: MEDICARE

## 2019-11-05 VITALS
OXYGEN SATURATION: 97 % | DIASTOLIC BLOOD PRESSURE: 70 MMHG | HEIGHT: 64 IN | HEART RATE: 92 BPM | SYSTOLIC BLOOD PRESSURE: 140 MMHG | WEIGHT: 127 LBS | BODY MASS INDEX: 21.68 KG/M2 | TEMPERATURE: 99 F

## 2019-11-05 DIAGNOSIS — I10 ESSENTIAL HYPERTENSION: ICD-10-CM

## 2019-11-05 DIAGNOSIS — I48.91 ATRIAL FIBRILLATION, UNSPECIFIED TYPE (HCC): ICD-10-CM

## 2019-11-05 DIAGNOSIS — R05.9 COUGH: Primary | ICD-10-CM

## 2019-11-05 PROCEDURE — G0463 HOSPITAL OUTPT CLINIC VISIT: HCPCS | Performed by: INTERNAL MEDICINE

## 2019-11-05 PROCEDURE — 99213 OFFICE O/P EST LOW 20 MIN: CPT | Performed by: INTERNAL MEDICINE

## 2019-11-05 NOTE — PROGRESS NOTES
HPI:    Patient ID: Gris Granados is a 80year old female. C/o cough productive with yellow sputum that started 3 days ago. However over the last day patient's cough has improved and sputum has now become white. She denies any fever or chills.   She bueno S2 normal. An irregularly irregular rhythm present. Pulmonary/Chest: Effort normal and breath sounds normal. No respiratory distress. She has no wheezes. Musculoskeletal:      Comments: Trace/1+ pedal edema     Neurological: She is alert.    Psychiatric:

## 2019-12-13 ENCOUNTER — OFFICE VISIT (OUTPATIENT)
Dept: DERMATOLOGY CLINIC | Facility: CLINIC | Age: 84
End: 2019-12-13
Payer: MEDICARE

## 2019-12-13 DIAGNOSIS — D23.4 BENIGN NEOPLASM OF SCALP AND SKIN OF NECK: ICD-10-CM

## 2019-12-13 DIAGNOSIS — D23.30 BENIGN NEOPLASM OF SKIN OF FACE: ICD-10-CM

## 2019-12-13 DIAGNOSIS — D23.60 BENIGN NEOPLASM OF SKIN OF UPPER LIMB, INCLUDING SHOULDER, UNSPECIFIED LATERALITY: ICD-10-CM

## 2019-12-13 DIAGNOSIS — L57.0 AK (ACTINIC KERATOSIS): Primary | ICD-10-CM

## 2019-12-13 DIAGNOSIS — L82.1 SEBORRHEIC KERATOSES: ICD-10-CM

## 2019-12-13 DIAGNOSIS — Z85.828 HISTORY OF SKIN CANCER: ICD-10-CM

## 2019-12-13 PROCEDURE — 99213 OFFICE O/P EST LOW 20 MIN: CPT | Performed by: DERMATOLOGY

## 2019-12-13 PROCEDURE — 17000 DESTRUCT PREMALG LESION: CPT | Performed by: DERMATOLOGY

## 2019-12-13 PROCEDURE — G0463 HOSPITAL OUTPT CLINIC VISIT: HCPCS | Performed by: DERMATOLOGY

## 2019-12-23 NOTE — PROGRESS NOTES
Baltazar Seip is a 80year old female. HPI:     CC:  Patient presents with:  Actinic Keratosis: LOV 10/14/2019. Pt presenting for 6 week f/u with AKs to face and neck. Pt also has a personal hx of BCC removed from R mid cheek in 2013.          Allergies: Outpatient Medications   Medication Sig Dispense Refill   • ELIQUIS 2.5 MG Oral Tab TAKE 1 TABLET (2.5 MG TOTAL) BY MOUTH 2 (TWO) TIMES DAILY. 60 tablet 11   • METOPROLOL TARTRATE 25 MG Oral Tab TAKE 1 TABLET (25 MG TOTAL) BY MOUTH 2 (TWO) TIMES DAILY.  61 Socioeconomic History      Marital status:        Spouse name: Not on file      Number of children: Not on file      Years of education: Not on file      Highest education level: Not on file    Occupational History      Not on file    Social Needs Handed: No        Right Handed: Yes        Currently spends a great deal of time in the sun: No        Past Sunlamp Treatments for Acne: Not Asked        History of tanning: No        Hx of Spending [address] of Time in Sun: Not Asked        Bad sunburns No new or changeing lesions other than noted above. No fevers, chills, night sweats, unusual sun sensitivity. No other skin complaints. History, medications, allergies reviewed as noted.        Physical Examination:     Well-developed well-nourished recurrence skin cancer.     Erythematous scaling keratotic papules right lateral brow, temple left medial nasal sidewall near previous excision scar, larger erythematous patch with more keratotic area inferiorly and slightly pearly papule medially at right with patient. Therapeutic options reviewed. See  Medications in grid. Instructions reviewed at length. Benign nevi, seborrheic  keratoses, cherry angiomas:  Reassurance regarding other benign skin lesions. Signs and symptoms of skin cancer, ABCDE's of

## 2020-01-01 ENCOUNTER — TELEPHONE (OUTPATIENT)
Dept: CARDIOLOGY | Age: 85
End: 2020-01-01

## 2020-01-01 ENCOUNTER — OFFICE VISIT (OUTPATIENT)
Dept: CARDIOLOGY | Age: 85
End: 2020-01-01

## 2020-01-01 VITALS
BODY MASS INDEX: 22.66 KG/M2 | WEIGHT: 120 LBS | HEART RATE: 63 BPM | OXYGEN SATURATION: 97 % | DIASTOLIC BLOOD PRESSURE: 84 MMHG | SYSTOLIC BLOOD PRESSURE: 128 MMHG | HEIGHT: 61 IN

## 2020-01-01 DIAGNOSIS — I48.21 PERMANENT ATRIAL FIBRILLATION (CMD): ICD-10-CM

## 2020-01-01 DIAGNOSIS — I50.33 ACUTE ON CHRONIC DIASTOLIC CONGESTIVE HEART FAILURE (CMD): Primary | ICD-10-CM

## 2020-01-01 DIAGNOSIS — E78.5 HYPERLIPIDEMIA, UNSPECIFIED HYPERLIPIDEMIA TYPE: ICD-10-CM

## 2020-01-01 DIAGNOSIS — I10 ESSENTIAL HYPERTENSION: ICD-10-CM

## 2020-01-01 LAB
ALBUMIN SERPL-MCNC: 3.2 G/DL
ALBUMIN/GLOB SERPL: 0.8 {RATIO}
ALP SERPL-CCNC: 182 U/L
ALT SERPL-CCNC: 44 U/L
ANION GAP SERPL CALC-SCNC: 4 MMOL/L
AST SERPL-CCNC: 58 U/L
BILIRUB SERPL-MCNC: 0.6 MG/DL
BUN SERPL-MCNC: 28 MG/DL
BUN/CREAT SERPL: 29.8
CALCIUM SERPL-MCNC: 9.5 MG/DL
CHLORIDE SERPL-SCNC: 104 MMOL/L
CO2 SERPL-SCNC: 34 MMOL/L
CREAT SERPL-MCNC: 0.94 MG/DL
GLOBULIN SER-MCNC: 3.9 G/DL
GLUCOSE SERPL-MCNC: 69 MG/DL
LENGTH OF FAST TIME PATIENT: NORMAL H
POTASSIUM SERPL-SCNC: 4.2 MMOL/L
PROT SERPL-MCNC: 7.1 G/DL
SODIUM SERPL-SCNC: 142 MMOL/L
T4 FREE SERPL-MCNC: 0.9 NG/DL
TSH SERPL-ACNC: 11.1 M[IU]/L

## 2020-01-01 PROCEDURE — 99214 OFFICE O/P EST MOD 30 MIN: CPT | Performed by: INTERNAL MEDICINE

## 2020-01-01 PROCEDURE — 99232 SBSQ HOSP IP/OBS MODERATE 35: CPT | Performed by: INTERNAL MEDICINE

## 2020-01-01 PROCEDURE — 99222 1ST HOSP IP/OBS MODERATE 55: CPT | Performed by: INTERNAL MEDICINE

## 2020-01-01 PROCEDURE — 93000 ELECTROCARDIOGRAM COMPLETE: CPT | Performed by: INTERNAL MEDICINE

## 2020-01-01 RX ORDER — LANOLIN ALCOHOL/MO/W.PET/CERES
CREAM (GRAM) TOPICAL
COMMUNITY
Start: 2018-06-28 | End: 2020-01-01

## 2020-01-01 RX ORDER — FUROSEMIDE 20 MG/1
TABLET ORAL
COMMUNITY
Start: 2019-01-04

## 2020-01-01 RX ORDER — LEVOTHYROXINE SODIUM 0.03 MG/1
25 TABLET ORAL
COMMUNITY
Start: 2020-01-01

## 2020-01-01 RX ORDER — LISINOPRIL 5 MG/1
TABLET ORAL
COMMUNITY
Start: 2019-01-04

## 2020-01-01 RX ORDER — UBIDECARENONE 75 MG
50 CAPSULE ORAL DAILY
COMMUNITY

## 2020-01-01 ASSESSMENT — PATIENT HEALTH QUESTIONNAIRE - PHQ9
1. LITTLE INTEREST OR PLEASURE IN DOING THINGS: NOT AT ALL
2. FEELING DOWN, DEPRESSED OR HOPELESS: NOT AT ALL
SUM OF ALL RESPONSES TO PHQ9 QUESTIONS 1 AND 2: 0
SUM OF ALL RESPONSES TO PHQ9 QUESTIONS 1 AND 2: 0

## 2020-01-06 ENCOUNTER — TELEPHONE (OUTPATIENT)
Dept: INTERNAL MEDICINE CLINIC | Facility: CLINIC | Age: 85
End: 2020-01-06

## 2020-01-06 ENCOUNTER — APPOINTMENT (OUTPATIENT)
Dept: GENERAL RADIOLOGY | Age: 85
DRG: 291 | End: 2020-01-06
Attending: EMERGENCY MEDICINE
Payer: MEDICARE

## 2020-01-06 ENCOUNTER — APPOINTMENT (OUTPATIENT)
Dept: GENERAL RADIOLOGY | Facility: HOSPITAL | Age: 85
DRG: 291 | End: 2020-01-06
Attending: EMERGENCY MEDICINE
Payer: MEDICARE

## 2020-01-06 ENCOUNTER — HOSPITAL ENCOUNTER (INPATIENT)
Facility: HOSPITAL | Age: 85
LOS: 3 days | Discharge: HOME HEALTH | DRG: 291 | End: 2020-01-09
Attending: EMERGENCY MEDICINE | Admitting: HOSPITALIST
Payer: MEDICARE

## 2020-01-06 DIAGNOSIS — I50.9 ACUTE ON CHRONIC CONGESTIVE HEART FAILURE, UNSPECIFIED HEART FAILURE TYPE (HCC): Primary | ICD-10-CM

## 2020-01-06 PROBLEM — I50.33 ACUTE ON CHRONIC DIASTOLIC (CONGESTIVE) HEART FAILURE (HCC): Status: ACTIVE | Noted: 2020-01-06

## 2020-01-06 PROCEDURE — 99223 1ST HOSP IP/OBS HIGH 75: CPT | Performed by: HOSPITALIST

## 2020-01-06 PROCEDURE — 71045 X-RAY EXAM CHEST 1 VIEW: CPT | Performed by: EMERGENCY MEDICINE

## 2020-01-06 RX ORDER — SODIUM CHLORIDE 0.9 % (FLUSH) 0.9 %
10 SYRINGE (ML) INJECTION AS NEEDED
Status: DISCONTINUED | OUTPATIENT
Start: 2020-01-06 | End: 2020-01-09

## 2020-01-06 RX ORDER — ACETAMINOPHEN 325 MG/1
650 TABLET ORAL EVERY 6 HOURS PRN
Status: DISCONTINUED | OUTPATIENT
Start: 2020-01-06 | End: 2020-01-09

## 2020-01-06 RX ORDER — LISINOPRIL 5 MG/1
5 TABLET ORAL DAILY
Status: DISCONTINUED | OUTPATIENT
Start: 2020-01-06 | End: 2020-01-09

## 2020-01-06 RX ORDER — NITROGLYCERIN 0.4 MG/1
0.4 TABLET SUBLINGUAL EVERY 5 MIN PRN
Status: DISCONTINUED | OUTPATIENT
Start: 2020-01-06 | End: 2020-01-09

## 2020-01-06 RX ORDER — CALCIUM CARBONATE/VITAMIN D3 250-3.125
2 TABLET ORAL DAILY
Status: DISCONTINUED | OUTPATIENT
Start: 2020-01-06 | End: 2020-01-09

## 2020-01-06 RX ORDER — ONDANSETRON 2 MG/ML
4 INJECTION INTRAMUSCULAR; INTRAVENOUS EVERY 6 HOURS PRN
Status: DISCONTINUED | OUTPATIENT
Start: 2020-01-06 | End: 2020-01-09

## 2020-01-06 RX ORDER — SOD CHLORD/LANOLIN/MIN.OIL/PET
1 LOTION (ML) TOPICAL 2 TIMES DAILY
Status: DISCONTINUED | OUTPATIENT
Start: 2020-01-06 | End: 2020-01-09

## 2020-01-06 RX ORDER — FUROSEMIDE 10 MG/ML
40 INJECTION INTRAMUSCULAR; INTRAVENOUS
Status: DISCONTINUED | OUTPATIENT
Start: 2020-01-06 | End: 2020-01-08

## 2020-01-06 RX ORDER — SODIUM CHLORIDE 0.9 % (FLUSH) 0.9 %
3 SYRINGE (ML) INJECTION AS NEEDED
Status: DISCONTINUED | OUTPATIENT
Start: 2020-01-06 | End: 2020-01-09

## 2020-01-06 RX ORDER — FUROSEMIDE 10 MG/ML
40 INJECTION INTRAMUSCULAR; INTRAVENOUS ONCE
Status: COMPLETED | OUTPATIENT
Start: 2020-01-06 | End: 2020-01-06

## 2020-01-06 NOTE — TELEPHONE ENCOUNTER
I think with those symptoms she should be taken to emergency room. There are multiple possibilities for her feeling fatigued and having difficulty breathing. Please ask family to take her directly to the emergency room.

## 2020-01-06 NOTE — TELEPHONE ENCOUNTER
Spoke to pt son Wei Media (ok per hippa) and advised on MD message below; son verbalized understanding and agreeable to ER visit.

## 2020-01-06 NOTE — TELEPHONE ENCOUNTER
Detail Level: Detailed To Dr. Miya Zepeda----    Spoke to patient who was A/O x4. She reports for the past 2-3 days she has felt extremely weak with mild difficulty breathing.  Pt reports she is able to walk but it is not as easy as normal. Pt not forthcoming with symptoms other than:

## 2020-01-06 NOTE — ED INITIAL ASSESSMENT (HPI)
Patient aox3 to ed via private vehicle patient co of sob with exertion with bilateral leg swelling, states worsening x few days, sent by pcp for further eval. Patient denies chest pain. resp unlabored at rest at this time.

## 2020-01-06 NOTE — TELEPHONE ENCOUNTER
Patient calling for appointment today with Dr. Jesenia Fountain. Not moving well. Feeling weak. No dizziness  Started about 2-3 days ago. Having difficulty getting any information from patient.      Best number to contact patient at [phone]

## 2020-01-07 ENCOUNTER — APPOINTMENT (OUTPATIENT)
Dept: CV DIAGNOSTICS | Facility: HOSPITAL | Age: 85
DRG: 291 | End: 2020-01-07
Attending: HOSPITALIST
Payer: MEDICARE

## 2020-01-07 PROCEDURE — 99233 SBSQ HOSP IP/OBS HIGH 50: CPT | Performed by: HOSPITALIST

## 2020-01-07 NOTE — CM/SW NOTE
JUANCARLOS self referred to patient chart for discharge planning needs. Per nursing rounds, pt is from home with daughter. Patient may benefit from [facility] services at discharge. JUANCARLOS spoke to dtr/Joann who states they would like to decline [facility] at this time.  Dtr is

## 2020-01-07 NOTE — CONSULTS
[facility]  Report of Consultation    Leyda Herrera Patient Status:  Inpatient    1922 MRN [id number]   Location [facility]5W Attending Kimberly Britton MD   Hosp Day # 0 PCP Sylvester Dalal MD     Reason for Consultation:  Shortness of Age of Onset   • Stroke Mother         (as per NG)   • Breast Cancer Sister         (as per NG)   • Kidney Disease Brother 5        Cause of death (as per NG)   • Cancer Brother         hx of SC ( unknown type)    • Cancer Daughter         Gastric Cancer ( bases, no retractions. Abdomen: Soft, non-tender. Extremities: Without clubbing, cyanosis. Bilateral 3+ LE edema. Neurologic: Alert and oriented, normal affect. Skin: Chronic LE stasis changes. Laboratories and Data:  Diagnostics:  EKG: a fib.   Ec PM

## 2020-01-07 NOTE — PROGRESS NOTES
[facility]    Progress Note    Cherri Gaona Patient Status:  Inpatient    1922 MRN [id number]   Location [address] [place] [address] Attending Virginia Nassar MD   Hosp Day # 1 PCP Chucky Cardenas MD       Subjective:   Cherri Gaona is a(n) TROP <0.045 01/06/2020    B12 414 07/09/2018       Xr Chest Ap Portable  (cpt=71045)    Result Date: 1/6/2020  CONCLUSION:  1. Moderate left and small right pleural effusion with compressive atelectasis.  2. Stable enlargement of the cardiac silhouette with

## 2020-01-07 NOTE — ED NOTES
Assumed care of pt. Updated on POC, aware of plan for admission tonight. Denies needs at this time. Family present at bedside.

## 2020-01-07 NOTE — PROGRESS NOTES
[facility] - [facility]    Cardiology Progress Note    Scot Bravo Patient Status:  Inpatient    1922 MRN [id number]   Location [facility] Attending Lesley Borges MD   Hosp Day # 1 PCP Karolina Kruse MD     Primary Cardiologist:  repeat ECHO pending    Chronic a fib  - rate controlled, cont metoprolol 25 mg BID  - anticoagulation, on Eliquis 2.5 mg BID (age, wt)    HTN  - controlled  - cont lisinopril    Results:     Lab Results   Component Value Date    WBC 4.1 01/07/2020    HGB 1

## 2020-01-07 NOTE — CARDIAC REHAB
CARDIAC REHAB HEART FAILURE EDUCATION    Handouts provided and reviewed: CHF Booklet. Activity: Chair for all meals: Yes    Disease Process: Disease process reviewed with patient and daughter.      Reviewed the following: DAILY WEIGHT MONITORING: Review

## 2020-01-07 NOTE — H&P
[facility]    PATIENT'S NAME: Yoan Tor PHYSICIAN: Chloé Vasquez MD   PATIENT ACCOUNT#:   [id number]    LOCATION:  [place] [address] RECORD #:   [id number]       YOB: 1922  ADMISSION DATE:       01/06/2020    H She is not able to tell me if she had orthopnea. No fever or chills, per the family. No sick contacts. The patient had been to several family dinners during the holidays and is not sure if she was eating high-salt diet.   Other 12-point review of systems

## 2020-01-07 NOTE — PLAN OF CARE
Patient pleasant and cooperative; able to feed self; hard of hearing. Patient's son requested podiatry consult for toenails, home health to have someone come to the house one time a week, and audiology.   Patient resting comfortably in bed, locked in the l bleeding and/or hematoma  - Assess quality of pulses, skin color and temperature  - Assess for signs of decreased coronary artery perfusion - ex.  Angina  - Evaluate fluid balance, assess for edema, trend weights  Outcome: Progressing  Goal: Absence of card

## 2020-01-07 NOTE — ED PROVIDER NOTES
Patient Seen in: [facility] AND [facility] Emergency Department      History   Patient presents with:  Cough/URI  Dyspnea LORIN SOB    Stated Complaint: sob x 1 day    HPI    The patient is a 80-year-old female with a history of paroxysmal atrial fibrillation, hyp complaint: sob x 1 day  Other systems are as noted in HPI. Constitutional and vital signs reviewed. All other systems reviewed and negative except as noted above.     Physical Exam     ED Triage Vitals [01/06/20 1721]   BP (!) 168/93   Pulse 86   Resp Labs Reviewed   BASIC METABOLIC PANEL (8) - Abnormal; Notable for the following components:       Result Value    Glucose 108 (*)     BUN 19 (*)     GFR, Non- 50 (*)     GFR, -American 58 (*)     All other components within pamela at 18:44            Radiology exams  Viewed and reviewed by myself and findings discussed with patient including need for follow up    Admission disposition: 1/6/2020  7:34 PM                   Disposition and Plan     Clinical Impression:  Acute on chroni

## 2020-01-08 ENCOUNTER — TELEPHONE (OUTPATIENT)
Dept: INTERNAL MEDICINE CLINIC | Facility: CLINIC | Age: 85
End: 2020-01-08

## 2020-01-08 ENCOUNTER — APPOINTMENT (OUTPATIENT)
Dept: CV DIAGNOSTICS | Facility: HOSPITAL | Age: 85
DRG: 291 | End: 2020-01-08
Attending: HOSPITALIST
Payer: MEDICARE

## 2020-01-08 PROCEDURE — 99233 SBSQ HOSP IP/OBS HIGH 50: CPT | Performed by: HOSPITALIST

## 2020-01-08 PROCEDURE — 93306 TTE W/DOPPLER COMPLETE: CPT | Performed by: HOSPITALIST

## 2020-01-08 RX ORDER — FUROSEMIDE 10 MG/ML
40 INJECTION INTRAMUSCULAR; INTRAVENOUS DAILY
Status: DISCONTINUED | OUTPATIENT
Start: 2020-01-09 | End: 2020-01-09

## 2020-01-08 NOTE — PROGRESS NOTES
Per granddaughter, patient's skin is extremely thin and paper tape should only be used. Dressing was difficult to remove when taking out RAC IV. Site was cleaned, xeroform in place and aquacel foam covered.   This writer told family and patient it would be

## 2020-01-08 NOTE — OCCUPATIONAL THERAPY NOTE
Occupational Therapy Missed Visit Note    Orders received and chart reviewed. Pt currently off unit at time of scheduled appointment, will follow up as schedule permits.     Christiane Pizano Eduard 87, OTR/L  [address]  #[phone]

## 2020-01-08 NOTE — PROGRESS NOTES
[facility]    Progress Note    Laura Silva Patient Status:  Inpatient    1922 MRN [id number]   Location [facility]5W Attending Suzette Monroe MD   Hosp Day # 2 PCP Andre Leone MD       Subjective:   Lauranaun Silva is fee • LUBRIDERM  1 Application Topical BID   • metoprolol Tartrate  25 mg Oral BID   • Calcium Carbonate-Vitamin D  2 tablet Oral Daily       Current PRN Inpatient Meds:      Normal Saline Flush, acetaminophen, ondansetron HCl, Atropine Sulfate, nitroGLYCERI at 18:41     Approved by (CST): Melody Palencia MD on 1/06/2020 at 18:44              Assessment and Plan:   Acute on chronic diastolic (congestive) heart failure (HCC)  -cont iv lasix  -daily wts  -strict I's an o's     Chronic atrial fibrillation  -contin

## 2020-01-08 NOTE — PHYSICAL THERAPY NOTE
PHYSICAL THERAPY QUICK EVALUATION - INPATIENT    Room Number: 701/928-C  Evaluation Date: 1/8/2020  Presenting Problem: Acute on Chronic CHF  Physician Order: PT Eval and Treat    Problem List  Principal Problem:    Acute on chronic congestive heart fail cookies. Able to dress self. Just need supervision for safety    SUBJECTIVE  Feel OK.      OBJECTIVE     Fall Risk: High fall risk    WEIGHT BEARING RESTRICTION  Weight Bearing Restriction: None                PAIN ASSESSMENT  Ratin         RANGE OF MOT was able to walk at least 200' with SBA s any SOB. Left on the recliner and encourage to be up at tolerance and to walk several times with staff while in  [facility].  Nursing to walk with pt while in hosp and will also be placed on restorative rehab for amb with

## 2020-01-08 NOTE — OCCUPATIONAL THERAPY NOTE
OCCUPATIONAL THERAPY EVALUATION - INPATIENT     Room Number: 572/858-Z  Evaluation Date: 1/8/2020  Type of Evaluation: Initial  Presenting Problem: SOB with BLE swelling    Physician Order: IP Consult to Occupational Therapy  Reason for Therapy: ADL/IADL History  Past Medical History:   Diagnosis Date   • Atrial fibrillation (HCC)     Paroxysmal Atrial Fibrillation; Treated via Coumadin (as per NG)    • Basal cell carcinoma 12/08/2017    Right mid cheek    • Basal cell carcinoma of left cheek 2013    Mohs. assistance/supervision with ADLs    RANGE OF MOTION   Upper extremity ROM is within functional limits    STRENGTH ASSESSMENT  Upper extremity strength is within functional limits    COORDINATION  Gross Motor: WFL   Fine Motor: WFL       ACTIVITIES OF DAILY

## 2020-01-08 NOTE — HOME CARE LIAISON
Received referral from [address] [place] [address]. Met with patient at the bedside. Patient is agreeable to [facility], pending orders. Residential brochure provided with contact information. All questions addressed and answered.      Patient will need

## 2020-01-08 NOTE — PROGRESS NOTES
[facility]    Progress Note    Kayleen Hull Patient Status:  Inpatient    1922 MRN [id number]   Location [facility]5W Attending Adela Coello MD   Hosp Day # 2 PCP Maribel Valencia MD        Subjective:     Cardiovascular 03/11/2019    MG 2.0 01/08/2020    TROP <0.045 01/06/2020    B12 414 07/09/2018       Xr Chest Ap Portable  (cpt=71045)    Result Date: 1/6/2020  CONCLUSION:  1. Moderate left and small right pleural effusion with compressive atelectasis.  2. Stable enlarge

## 2020-01-08 NOTE — TELEPHONE ENCOUNTER
Please call [facility] at [phone]  Will Dr Jean-Claude العراقي sign and manage home health for pt?   Tasked to nursing

## 2020-01-08 NOTE — TELEPHONE ENCOUNTER
Agustin Kaufman from [facility]. is calling to inform Dr. Monet Olsen that recommendations for [facility] has been made for the pt. She wants to know will he manage & sign H.H. orders ph.  # [phone]   Routed to clinical

## 2020-01-09 VITALS
OXYGEN SATURATION: 92 % | DIASTOLIC BLOOD PRESSURE: 74 MMHG | BODY MASS INDEX: 23.63 KG/M2 | RESPIRATION RATE: 18 BRPM | WEIGHT: 120.38 LBS | HEIGHT: 60 IN | SYSTOLIC BLOOD PRESSURE: 134 MMHG | TEMPERATURE: 98 F | HEART RATE: 81 BPM

## 2020-01-09 PROCEDURE — 99239 HOSP IP/OBS DSCHRG MGMT >30: CPT | Performed by: HOSPITALIST

## 2020-01-09 RX ORDER — FUROSEMIDE 20 MG/1
20 TABLET ORAL DAILY
Status: DISCONTINUED | OUTPATIENT
Start: 2020-01-10 | End: 2020-01-09

## 2020-01-09 RX ORDER — LISINOPRIL 5 MG/1
TABLET ORAL
Qty: 60 TABLET | Refills: 11 | Status: SHIPPED | OUTPATIENT
Start: 2020-01-09

## 2020-01-09 NOTE — PROGRESS NOTES
[facility]OSP - [facility]    Cardiology Progress Note    Sukh Casas Patient Status:  Inpatient    1922 MRN [id number]   Location [facility]5W Attending Eli Rabago MD   Hosp Day # 3 PCP Juanita Hammond MD         Assessment and Plan: 35.0 (H) 01/09/2020    GLU 68 (L) 01/09/2020    CA 8.7 01/09/2020    ALB 3.5 09/17/2019    ALKPHO 178 (H) 09/17/2019    BILT 0.6 09/17/2019    TP 7.2 09/17/2019    AST 43 (H) 09/17/2019    ALT 23 09/17/2019    PTT 29.4 08/24/2017    INR 1.0 08/24/2017    T

## 2020-01-09 NOTE — TELEPHONE ENCOUNTER
[facility] called back and left detailed voice message stating Dr Cary Hood will manage and sign patient's home health. Sravani to call back office for any further questions.

## 2020-01-09 NOTE — RESTORATIVE THERAPY
RESTORATIVE CARE TREATMENT NOTE    Presenting Problem  Presenting Problem: Acute on Chronic CHF  Presenting Problem: SOB with BLE swelling       Precautions  Precautions: None       Weight Bearing Restriction  Weight Bearing Restriction: None

## 2020-01-09 NOTE — DISCHARGE PLANNING
Pt is a/o x 4, denies pain, denies SOB. To be discharged today with [facility]. Went over heart failure discharge instructions. Explained importance of recommended follow up appointments.  Went over medications and side effects with patient and

## 2020-01-09 NOTE — PLAN OF CARE
Problem: Patient Centered Care  Goal: Patient preferences are identified and integrated in the patient's plan of care  Description  Interventions:  - What would you like us to know as we care for you?  I live at home with my daughter.   - Provide timely, Continuous cardiac monitoring, monitor vital signs, obtain 12 lead EKG if indicated  - Evaluate effectiveness of antiarrhythmic and heart rate control medications as ordered  - Initiate emergency measures for life threatening arrhythmias  - Monitor electro

## 2020-01-09 NOTE — DISCHARGE SUMMARY
[facility] - [facility]    Discharge Summary    Raymundo Kincaid Patient Status:  Inpatient    1922 MRN [id number]   Location [facility]5W Attending Kenny Vargas MD   Hosp Day # 3 PCP Jae Nieto MD     Date of Admission: 2020 inspiration  LUNGS:  No audible wheezing  ABDOMEN: Non-distended  EXTREMITIES: improvement in pitting edema, chronic venous stasis changes  NEUROLOGICAL:  There was no focal deficit. CULTURE:   No results found for this visit on 01/06/20.     IMAGING S INR 1.0 08/24/2017       Discharge Medications:      Discharge Medications      CHANGE how you take these medications      Instructions Prescription details   lisinopril 5 MG Tabs  What changed:  additional instructions      TAKE 1 TABLETS EVERY MORNING PM

## 2020-01-09 NOTE — PLAN OF CARE
Patient more mobile today during day; took several walks. Patient resting comfortably in chair, call light within reach, non-skid socks within reach. Will continue to monitor.         Problem: Patient Centered Care  Goal: Patient preferences are identified Angina  - Evaluate fluid balance, assess for edema, trend weights  Outcome: Progressing  Goal: Absence of cardiac arrhythmias or at baseline  Description  INTERVENTIONS:  - Continuous cardiac monitoring, monitor vital signs, obtain 12 lead EKG if indicated

## 2020-01-09 NOTE — PLAN OF CARE
Patient denies CP, SOB, and or dizziness. Had 3 second pause with no symptoms, cardiology aware. Kidneys stable today, IV lasix given. Possible discharge to home today with residential [place] [address], orders in place, pending cardiology clearance.      Problem: Dyana Assess for signs of decreased coronary artery perfusion - ex.  Angina  - Evaluate fluid balance, assess for edema, trend weights  Outcome: Progressing  Goal: Absence of cardiac arrhythmias or at baseline  Description  INTERVENTIONS:  - Continuous cardiac mo

## 2020-01-10 ENCOUNTER — PATIENT OUTREACH (OUTPATIENT)
Dept: CASE MANAGEMENT | Age: 85
End: 2020-01-10

## 2020-01-10 ENCOUNTER — TELEPHONE (OUTPATIENT)
Dept: CASE MANAGEMENT | Age: 85
End: 2020-01-10

## 2020-01-10 DIAGNOSIS — Z02.9 ENCOUNTERS FOR UNSPECIFIED ADMINISTRATIVE PURPOSE: ICD-10-CM

## 2020-01-10 DIAGNOSIS — I50.9 ACUTE ON CHRONIC CONGESTIVE HEART FAILURE, UNSPECIFIED HEART FAILURE TYPE (HCC): ICD-10-CM

## 2020-01-10 PROCEDURE — 1111F DSCHRG MED/CURRENT MED MERGE: CPT

## 2020-01-10 NOTE — PROGRESS NOTES
Initial Post Discharge Follow Up   Discharge Date: 1/9/20  Contact Date: 1/10/2020    Consent Verification:  Assessment Completed With: Patient  HIPAA Verified?   Yes    Discharge Dx:  Acute on chronic diastolic (congestive) heart failure     General: • Calcium Carbonate-Vit D-Min (CALCIUM 600 + MINERALS OR) Take 1 tablet by mouth daily. • acetaminophen 325 MG Oral Tab Take 325 mg by mouth every 6 (six) hours as needed for Pain.        • (NCM)  Were there any medication changes noted on AVS?  yes [address])        Jan 16, 2020 10:00 AM [address] Follow Up with Juan David Schafer MD [facility] ([address])        Jan 21, 2020 10:30 AM CST Exam - Established with Juan David Schafer,

## 2020-01-13 ENCOUNTER — TELEPHONE (OUTPATIENT)
Dept: INTERNAL MEDICINE CLINIC | Facility: CLINIC | Age: 85
End: 2020-01-13

## 2020-01-13 NOTE — TELEPHONE ENCOUNTER
Carlie from [facility]. H.H. is calling PT eval was done today pt will be seen for  two times a week for two weeks ph.  # [phone]   Routed to clinical

## 2020-01-13 NOTE — TELEPHONE ENCOUNTER
As FYI to DR. ELA Sexton from [facility] and gave verbal approval for plan of care per protocol - left on confidential VM

## 2020-01-15 ENCOUNTER — TELEPHONE (OUTPATIENT)
Dept: INTERNAL MEDICINE CLINIC | Facility: CLINIC | Age: 85
End: 2020-01-15

## 2020-01-15 ENCOUNTER — OFFICE VISIT (OUTPATIENT)
Dept: CARDIOLOGY CLINIC | Facility: HOSPITAL | Age: 85
End: 2020-01-15
Attending: INTERNAL MEDICINE
Payer: MEDICARE

## 2020-01-15 VITALS
HEART RATE: 75 BPM | WEIGHT: 122 LBS | BODY MASS INDEX: 24 KG/M2 | OXYGEN SATURATION: 100 % | SYSTOLIC BLOOD PRESSURE: 152 MMHG | DIASTOLIC BLOOD PRESSURE: 82 MMHG

## 2020-01-15 DIAGNOSIS — I07.1 SEVERE TRICUSPID REGURGITATION: ICD-10-CM

## 2020-01-15 DIAGNOSIS — I48.21 PERMANENT ATRIAL FIBRILLATION (HCC): ICD-10-CM

## 2020-01-15 DIAGNOSIS — I27.20 PULMONARY HYPERTENSION (HCC): ICD-10-CM

## 2020-01-15 DIAGNOSIS — I50.33 ACUTE ON CHRONIC DIASTOLIC CONGESTIVE HEART FAILURE (HCC): Primary | ICD-10-CM

## 2020-01-15 DIAGNOSIS — R74.8 ELEVATED ALKALINE PHOSPHATASE LEVEL: ICD-10-CM

## 2020-01-15 DIAGNOSIS — I87.2 CHRONIC VENOUS INSUFFICIENCY OF LOWER EXTREMITY: ICD-10-CM

## 2020-01-15 DIAGNOSIS — I50.9 HEART FAILURE, UNSPECIFIED (HCC): ICD-10-CM

## 2020-01-15 LAB
ANION GAP SERPL CALC-SCNC: 3 MMOL/L (ref 0–18)
BASOPHILS # BLD AUTO: 0.04 X10(3) UL (ref 0–0.2)
BASOPHILS NFR BLD AUTO: 0.8 %
BUN BLD-MCNC: 25 MG/DL (ref 7–18)
BUN/CREAT SERPL: 26.3 (ref 10–20)
CALCIUM BLD-MCNC: 9.5 MG/DL (ref 8.5–10.1)
CHLORIDE SERPL-SCNC: 97 MMOL/L (ref 98–112)
CO2 SERPL-SCNC: 37 MMOL/L (ref 21–32)
CREAT BLD-MCNC: 0.95 MG/DL (ref 0.55–1.02)
DEPRECATED RDW RBC AUTO: 54.9 FL (ref 35.1–46.3)
EOSINOPHIL # BLD AUTO: 0.09 X10(3) UL (ref 0–0.7)
EOSINOPHIL NFR BLD AUTO: 1.8 %
ERYTHROCYTE [DISTWIDTH] IN BLOOD BY AUTOMATED COUNT: 16.1 % (ref 11–15)
GLUCOSE BLD-MCNC: 100 MG/DL (ref 70–99)
HCT VFR BLD AUTO: 39 % (ref 35–48)
HGB BLD-MCNC: 12.5 G/DL (ref 12–16)
IMM GRANULOCYTES # BLD AUTO: 0.02 X10(3) UL (ref 0–1)
IMM GRANULOCYTES NFR BLD: 0.4 %
LYMPHOCYTES # BLD AUTO: 0.84 X10(3) UL (ref 1–4)
LYMPHOCYTES NFR BLD AUTO: 16.3 %
MCH RBC QN AUTO: 30 PG (ref 26–34)
MCHC RBC AUTO-ENTMCNC: 32.1 G/DL (ref 31–37)
MCV RBC AUTO: 93.5 FL (ref 80–100)
MONOCYTES # BLD AUTO: 0.56 X10(3) UL (ref 0.1–1)
MONOCYTES NFR BLD AUTO: 10.9 %
NEUTROPHILS # BLD AUTO: 3.59 X10 (3) UL (ref 1.5–7.7)
NEUTROPHILS # BLD AUTO: 3.59 X10(3) UL (ref 1.5–7.7)
NEUTROPHILS NFR BLD AUTO: 69.8 %
NT-PROBNP SERPL-MCNC: 1671 PG/ML (ref ?–450)
OSMOLALITY SERPL CALC.SUM OF ELEC: 288 MOSM/KG (ref 275–295)
PATIENT FASTING Y/N/NP: NO
PLATELET # BLD AUTO: 170 10(3)UL (ref 150–450)
POTASSIUM SERPL-SCNC: 4.5 MMOL/L (ref 3.5–5.1)
RBC # BLD AUTO: 4.17 X10(6)UL (ref 3.8–5.3)
SODIUM SERPL-SCNC: 137 MMOL/L (ref 136–145)
WBC # BLD AUTO: 5.1 X10(3) UL (ref 4–11)

## 2020-01-15 PROCEDURE — 85025 COMPLETE CBC W/AUTO DIFF WBC: CPT | Performed by: NURSE PRACTITIONER

## 2020-01-15 PROCEDURE — 99212 OFFICE O/P EST SF 10 MIN: CPT | Performed by: NURSE PRACTITIONER

## 2020-01-15 PROCEDURE — 80048 BASIC METABOLIC PNL TOTAL CA: CPT | Performed by: NURSE PRACTITIONER

## 2020-01-15 PROCEDURE — 99214 OFFICE O/P EST MOD 30 MIN: CPT | Performed by: NURSE PRACTITIONER

## 2020-01-15 PROCEDURE — 36415 COLL VENOUS BLD VENIPUNCTURE: CPT | Performed by: NURSE PRACTITIONER

## 2020-01-15 PROCEDURE — 80076 HEPATIC FUNCTION PANEL: CPT | Performed by: NURSE PRACTITIONER

## 2020-01-15 PROCEDURE — 83880 ASSAY OF NATRIURETIC PEPTIDE: CPT | Performed by: NURSE PRACTITIONER

## 2020-01-15 NOTE — TELEPHONE ENCOUNTER
[facility] called  Planning on seeing pt for  OT   2x a week for 2 weeks  Tasked to nursing

## 2020-01-15 NOTE — PROGRESS NOTES
[facility] Patient Status:  No patient class for patient encounter    1922 MRN [id number]   Location [facility] MD Dr. Ian Alcantara is a 80year old female 08:47 AM    ALB 3.5 09/17/2019 10:54 AM    ALKPHO 178 (H) 09/17/2019 10:54 AM    BILT 0.6 09/17/2019 10:54 AM    TP 7.2 09/17/2019 10:54 AM    AST 43 (H) 09/17/2019 10:54 AM    ALT 23 09/17/2019 10:54 AM    PTT 29.4 08/24/2017 06:12 PM    INR 1.0 08/24/201 stage C  - moderate pulmonary HTN and chronic venous stasis with edema  - improved fluid overload on lasix 20 mg daily, near euvolemic with chronic flor LE swelling,near baseline  - renal function stable, bun 25, cr 0.95, K 4.5  -pro bnp up to 1671 from 101 2-3 times a day. Pace your activity to prevent shortness of breath or fatigue.  Stop exercise if you develop chest pain, lightheadedness, or significant shortness of breath                                Current Outpatient Medications:   •  lisinopril 5 MG

## 2020-01-15 NOTE — TELEPHONE ENCOUNTER
As FYI to DR. MAHMOOD - travon Fisher from [facility] and gave verbal approval per protocol - verbalized understanding

## 2020-01-16 ENCOUNTER — TELEPHONE (OUTPATIENT)
Dept: INTERNAL MEDICINE CLINIC | Facility: CLINIC | Age: 85
End: 2020-01-16

## 2020-01-16 ENCOUNTER — OFFICE VISIT (OUTPATIENT)
Dept: INTERNAL MEDICINE CLINIC | Facility: CLINIC | Age: 85
End: 2020-01-16
Payer: MEDICARE

## 2020-01-16 VITALS
SYSTOLIC BLOOD PRESSURE: 144 MMHG | OXYGEN SATURATION: 99 % | DIASTOLIC BLOOD PRESSURE: 88 MMHG | HEIGHT: 60 IN | TEMPERATURE: 98 F | BODY MASS INDEX: 23.95 KG/M2 | HEART RATE: 51 BPM | WEIGHT: 122 LBS | RESPIRATION RATE: 16 BRPM

## 2020-01-16 DIAGNOSIS — I48.91 ATRIAL FIBRILLATION, UNSPECIFIED TYPE (HCC): ICD-10-CM

## 2020-01-16 DIAGNOSIS — I51.89 DIASTOLIC DYSFUNCTION: Primary | ICD-10-CM

## 2020-01-16 DIAGNOSIS — R74.8 ELEVATED ALKALINE PHOSPHATASE LEVEL: Primary | ICD-10-CM

## 2020-01-16 DIAGNOSIS — R60.0 EDEMA OF BOTH LOWER EXTREMITIES: ICD-10-CM

## 2020-01-16 DIAGNOSIS — I36.1 NONRHEUMATIC TRICUSPID VALVE REGURGITATION: ICD-10-CM

## 2020-01-16 DIAGNOSIS — R74.8 ELEVATED LIVER ENZYMES: ICD-10-CM

## 2020-01-16 DIAGNOSIS — I10 ESSENTIAL HYPERTENSION: ICD-10-CM

## 2020-01-16 DIAGNOSIS — I34.0 MITRAL VALVE INSUFFICIENCY, UNSPECIFIED ETIOLOGY: ICD-10-CM

## 2020-01-16 LAB
ALBUMIN SERPL-MCNC: 3.1 G/DL (ref 3.4–5)
ALP LIVER SERPL-CCNC: 179 U/L (ref 55–142)
ALT SERPL-CCNC: 39 U/L (ref 13–56)
AST SERPL-CCNC: 50 U/L (ref 15–37)
BILIRUB DIRECT SERPL-MCNC: 0.2 MG/DL (ref 0–0.2)
BILIRUB SERPL-MCNC: 0.6 MG/DL (ref 0.1–2)
M PROTEIN MFR SERPL ELPH: 7 G/DL (ref 6.4–8.2)

## 2020-01-16 PROCEDURE — 99496 TRANSJ CARE MGMT HIGH F2F 7D: CPT | Performed by: INTERNAL MEDICINE

## 2020-01-16 PROCEDURE — 1111F DSCHRG MED/CURRENT MED MERGE: CPT | Performed by: INTERNAL MEDICINE

## 2020-01-16 NOTE — PROGRESS NOTES
HPI:    Scot Bravo is a 80year old female here today for hospital follow up.    She was discharged from Inpatient hospital, [facility] to Home   Admission Date: 1/6/20   Discharge Date: 1/9/20  Hospital Discharge Diagnoses (since 12/17/2019)     acu Mild to moderate mitral regurgitation. Moderate to severe tricuspid regurgitation. Systolic pulmonary pressures were elevated. There was a left pleural effusion.     Chest x-ray when she was at the hospital showed moderate left and small right pleural ef each time before application  Cyanocobalamin (VITAMIN B 12 OR), Take by mouth daily. Calcium Carbonate-Vit D-Min (CALCIUM 600 + MINERALS OR), Take 1 tablet by mouth daily. No current facility-administered medications on file prior to visit.          H is 23.83 kg/m² as calculated from the following:    Height as of this encounter: 5' (1.524 m). Weight as of this encounter: 122 lb (55.3 kg).    /88   Pulse 51   Temp 97.6 °F (36.4 °C) (Oral)   Resp 16   Ht 5' (1.524 m)   Wt 122 lb (55.3 kg)   SpO2 to Be Reviewed: severe  · Risk of Significant Complications, Morbidity, and/or Mortality: severe    Overall Risk:   severe    Patient seen within 7 days from date of discharge.      Erica Ramirez MD, 1/16/2020

## 2020-01-20 ENCOUNTER — TELEPHONE (OUTPATIENT)
Dept: INTERNAL MEDICINE CLINIC | Facility: CLINIC | Age: 85
End: 2020-01-20

## 2020-01-20 NOTE — TELEPHONE ENCOUNTER
As FYI to DR. MAHMOOD - called jovany CHANG with [facility] and gave verbal approval for plan of care per protocol - she will see patient again on Wednesday and educated son on watching out of SX for UTI- patient seemed a little slower and \"out of it \" the past coupe days

## 2020-01-23 NOTE — TELEPHONE ENCOUNTER
Fax received from the answering service with page (that was not delivered) from patients son Jill Speaks. I called patients son who wanted to speak to [facility] regarding patients condition.  Seda Leos states that when patient got discharged from the hospital a

## 2020-01-23 NOTE — TELEPHONE ENCOUNTER
Discussed with Tony Arcos. He says mother looks like \"night and day. She is much better. She is back to herself. Discussed possibility of a TIA or mini stroke. Other causes of her fatigue Monday and Tuesday.   For now we both feel that emergency room evalua

## 2020-01-23 NOTE — TELEPHONE ENCOUNTER
Left left message on David's phone. Discussed my concern regarding stroke or TIA or something with the brain such as bleeding or fluid. Discussed taking mom to the emergency room. I then called daughter Jennfier Peterson with the same message.   Daughter states that

## 2020-01-27 ENCOUNTER — TELEPHONE (OUTPATIENT)
Dept: INTERNAL MEDICINE CLINIC | Facility: CLINIC | Age: 85
End: 2020-01-27

## 2020-01-27 NOTE — TELEPHONE ENCOUNTER
Relayed verbal order per Dr Akosua Fay, ok to discharge from agency on 1/30/20, on Carlie's confidential voicemail.

## 2020-01-27 NOTE — TELEPHONE ENCOUNTER
Needs verbal order for patient as she is requesting discharge from agency on 1/30/20 as patient says she is feeling better.

## 2020-01-29 ENCOUNTER — OFFICE VISIT (OUTPATIENT)
Dept: CARDIOLOGY CLINIC | Facility: HOSPITAL | Age: 85
End: 2020-01-29
Attending: NURSE PRACTITIONER
Payer: MEDICARE

## 2020-01-29 VITALS
BODY MASS INDEX: 24 KG/M2 | SYSTOLIC BLOOD PRESSURE: 150 MMHG | WEIGHT: 125.19 LBS | OXYGEN SATURATION: 95 % | HEART RATE: 71 BPM | DIASTOLIC BLOOD PRESSURE: 90 MMHG

## 2020-01-29 DIAGNOSIS — I48.21 PERMANENT ATRIAL FIBRILLATION (HCC): ICD-10-CM

## 2020-01-29 DIAGNOSIS — I10 ESSENTIAL HYPERTENSION: ICD-10-CM

## 2020-01-29 DIAGNOSIS — I50.33 ACUTE ON CHRONIC DIASTOLIC CONGESTIVE HEART FAILURE (HCC): ICD-10-CM

## 2020-01-29 DIAGNOSIS — I87.2 CHRONIC VENOUS INSUFFICIENCY OF LOWER EXTREMITY: Primary | ICD-10-CM

## 2020-01-29 PROCEDURE — 99213 OFFICE O/P EST LOW 20 MIN: CPT | Performed by: NURSE PRACTITIONER

## 2020-01-29 PROCEDURE — 99211 OFF/OP EST MAY X REQ PHY/QHP: CPT | Performed by: NURSE PRACTITIONER

## 2020-01-29 NOTE — PROGRESS NOTES
[facility] Patient Status:  No patient class for patient encounter    1922 MRN [id number]   Location [facility] MD Dr. Urszula Hyde is a 80year old female 08:47 AM    .0 01/15/2020 08:47 AM    CREATSERUM 0.95 01/15/2020 08:47 AM    BUN 25 (H) 01/15/2020 08:47 AM     01/15/2020 08:47 AM    K 4.5 01/15/2020 08:47 AM    CL 97 (L) 01/15/2020 08:47 AM    CO2 37.0 (H) 01/15/2020 08:47 AM     (H fluid restrictions, daily weights, medication regimen s/s of heart failure exacerbation and when to call APN/clinic. Greater than 30 minutes with this patient providing counseling, coordination of care and education given. Patient receptive.     Assessment: pre-packaged rice or potatoes. Please remember to read nutrition labels for sodium content.     www.healthyeating. nhlbi.nih.gov      Exercise daily as tolerated, with goal of doing moderate aerobic exercise like walking for about 30 minutes 5 days per week

## 2020-02-17 ENCOUNTER — LAB ENCOUNTER (OUTPATIENT)
Dept: LAB | Facility: HOSPITAL | Age: 85
End: 2020-02-17
Attending: INTERNAL MEDICINE
Payer: MEDICARE

## 2020-02-17 ENCOUNTER — OFFICE VISIT (OUTPATIENT)
Dept: INTERNAL MEDICINE CLINIC | Facility: CLINIC | Age: 85
End: 2020-02-17
Payer: MEDICARE

## 2020-02-17 VITALS
BODY MASS INDEX: 24.47 KG/M2 | TEMPERATURE: 95 F | DIASTOLIC BLOOD PRESSURE: 80 MMHG | OXYGEN SATURATION: 95 % | HEIGHT: 60 IN | SYSTOLIC BLOOD PRESSURE: 130 MMHG | WEIGHT: 124.63 LBS | HEART RATE: 64 BPM

## 2020-02-17 DIAGNOSIS — Z23 NEED FOR VACCINATION AGAINST STREPTOCOCCUS PNEUMONIAE: ICD-10-CM

## 2020-02-17 DIAGNOSIS — M20.012 MALLET FINGER OF LEFT FINGER(S): ICD-10-CM

## 2020-02-17 DIAGNOSIS — I48.91 ATRIAL FIBRILLATION, UNSPECIFIED TYPE (HCC): ICD-10-CM

## 2020-02-17 DIAGNOSIS — I34.0 MITRAL VALVE INSUFFICIENCY, UNSPECIFIED ETIOLOGY: ICD-10-CM

## 2020-02-17 DIAGNOSIS — I07.1 TRICUSPID VALVE INSUFFICIENCY, UNSPECIFIED ETIOLOGY: ICD-10-CM

## 2020-02-17 DIAGNOSIS — I51.89 GRADE III DIASTOLIC DYSFUNCTION: ICD-10-CM

## 2020-02-17 DIAGNOSIS — I51.89 GRADE III DIASTOLIC DYSFUNCTION: Primary | ICD-10-CM

## 2020-02-17 DIAGNOSIS — R74.8 ELEVATED ALKALINE PHOSPHATASE LEVEL: ICD-10-CM

## 2020-02-17 DIAGNOSIS — I10 ESSENTIAL HYPERTENSION: ICD-10-CM

## 2020-02-17 LAB
ALBUMIN SERPL-MCNC: 3.2 G/DL (ref 3.4–5)
ALBUMIN/GLOB SERPL: 0.8 {RATIO} (ref 1–2)
ALP LIVER SERPL-CCNC: 182 U/L (ref 55–142)
ALT SERPL-CCNC: 44 U/L (ref 13–56)
ANION GAP SERPL CALC-SCNC: 4 MMOL/L (ref 0–18)
AST SERPL-CCNC: 58 U/L (ref 15–37)
BILIRUB SERPL-MCNC: 0.6 MG/DL (ref 0.1–2)
BUN BLD-MCNC: 28 MG/DL (ref 7–18)
BUN/CREAT SERPL: 29.8 (ref 10–20)
CALCIUM BLD-MCNC: 9.5 MG/DL (ref 8.5–10.1)
CHLORIDE SERPL-SCNC: 104 MMOL/L (ref 98–112)
CO2 SERPL-SCNC: 34 MMOL/L (ref 21–32)
CREAT BLD-MCNC: 0.94 MG/DL (ref 0.55–1.02)
GLOBULIN PLAS-MCNC: 3.9 G/DL (ref 2.8–4.4)
GLUCOSE BLD-MCNC: 69 MG/DL (ref 70–99)
M PROTEIN MFR SERPL ELPH: 7.1 G/DL (ref 6.4–8.2)
OSMOLALITY SERPL CALC.SUM OF ELEC: 298 MOSM/KG (ref 275–295)
PATIENT FASTING Y/N/NP: NO
POTASSIUM SERPL-SCNC: 4.2 MMOL/L (ref 3.5–5.1)
SODIUM SERPL-SCNC: 142 MMOL/L (ref 136–145)
T4 FREE SERPL-MCNC: 0.9 NG/DL (ref 0.8–1.7)
TSI SER-ACNC: 11.1 MIU/ML (ref 0.36–3.74)

## 2020-02-17 PROCEDURE — 99214 OFFICE O/P EST MOD 30 MIN: CPT | Performed by: INTERNAL MEDICINE

## 2020-02-17 PROCEDURE — 84443 ASSAY THYROID STIM HORMONE: CPT

## 2020-02-17 PROCEDURE — 80053 COMPREHEN METABOLIC PANEL: CPT

## 2020-02-17 PROCEDURE — G0463 HOSPITAL OUTPT CLINIC VISIT: HCPCS | Performed by: INTERNAL MEDICINE

## 2020-02-17 PROCEDURE — 36415 COLL VENOUS BLD VENIPUNCTURE: CPT

## 2020-02-17 PROCEDURE — 84439 ASSAY OF FREE THYROXINE: CPT

## 2020-02-17 NOTE — PROGRESS NOTES
Leyda Herrera is a 80year old female. HPI:   Patient presents with: Follow - Up: 1 month F/U - edema, problem with left middle finger     Patient is 80years of age. She feels well.   She was recently in the hospital.  She did follow-up with Leonila Yee will be able to see  today.   Current Outpatient Medications   Medication Sig Dispense Refill   • lisinopril 5 MG Oral Tab TAKE 1 TABLETS EVERY MORNING 60 tablet 11   • ELIQUIS 2.5 MG Oral Tab TAKE 1 TABLET (2.5 MG TOTAL) BY MOUTH 2 (TWO) TIMES DA GENERAL HEALTH:  feels well otherwise  RESPIRATORY:  Voices no shortness of breath with exertion or cough  CARDIOVASCULAR:  Voices no chest pain on exertion or shortness of breath  GI:   Voices no abdominal pain or changes of bowels   :Viices no urning but will decide that with updated labs  - COMP METABOLIC PANEL (14); Future  - TSH W REFLEX TO FREE T4; Future    7. Need for vaccination against Streptococcus pneumoniae  Patient did have Pneumovax 1992 in 2002. Will address at a future visit    8.  Radha Mejía

## 2020-02-18 ENCOUNTER — TELEPHONE (OUTPATIENT)
Dept: INTERNAL MEDICINE CLINIC | Facility: CLINIC | Age: 85
End: 2020-02-18

## 2020-02-18 RX ORDER — LEVOTHYROXINE SODIUM 0.03 MG/1
25 TABLET ORAL
Qty: 30 TABLET | Refills: 11 | Status: SHIPPED | OUTPATIENT
Start: 2020-02-18

## 2020-02-18 NOTE — TELEPHONE ENCOUNTER
Please call patient and let her know that there were 2 abnormalities on her labs. One is an elevated enzyme that we have spoken about before. I did give her order for liver ultrasound. I would like her to have this sometime that she is able to.   This wi

## 2020-02-26 RX ORDER — FUROSEMIDE 20 MG/1
TABLET ORAL
Qty: 30 TABLET | Refills: 2 | Status: SHIPPED | OUTPATIENT
Start: 2020-02-26

## 2020-02-27 ENCOUNTER — TELEPHONE (OUTPATIENT)
Dept: INTERNAL MEDICINE CLINIC | Facility: CLINIC | Age: 85
End: 2020-02-27

## 2020-02-27 DIAGNOSIS — I48.91 ATRIAL FIBRILLATION, UNSPECIFIED TYPE (HCC): ICD-10-CM

## 2020-02-27 DIAGNOSIS — I51.89 GRADE III DIASTOLIC DYSFUNCTION: Primary | ICD-10-CM

## 2020-02-27 DIAGNOSIS — I34.0 MITRAL VALVE INSUFFICIENCY, UNSPECIFIED ETIOLOGY: ICD-10-CM

## 2020-02-27 DIAGNOSIS — I07.1 TRICUSPID VALVE INSUFFICIENCY, UNSPECIFIED ETIOLOGY: ICD-10-CM

## 2020-02-27 DIAGNOSIS — I10 ESSENTIAL HYPERTENSION: ICD-10-CM

## 2020-02-27 RX ORDER — DOXYCYCLINE HYCLATE 100 MG/1
100 CAPSULE ORAL 2 TIMES DAILY
Qty: 14 CAPSULE | Refills: 0 | Status: SHIPPED | OUTPATIENT
Start: 2020-02-27 | End: 2020-03-02

## 2020-02-27 NOTE — TELEPHONE ENCOUNTER
To Dr. Lonnie Soler - see below - sx onset: last 2-3 days ago. As below - phlegm is yellowish. BP: 130/90. Sleeping a lot, labored breathing but when son left she was up walking around and laughing. OK to order [facility]?

## 2020-02-27 NOTE — TELEPHONE ENCOUNTER
Son is calling, asking if [facility] could come out to see patient. She is wheezing, coughing and some labor breathing.   Blood pressure higher then usual.  Patient is refusing to see physician or go to hospital.    Son called [facility], they are stating only дмитрий

## 2020-02-27 NOTE — TELEPHONE ENCOUNTER
Order and demographic sheet faxed to [facility]. Fax confirmation received. Spoke with patient's son Irasema Maynard and notified him that order had been sent to [facility]. I told him to please follow up if they do not hear from them.  He verbalized understandi

## 2020-03-02 ENCOUNTER — TELEPHONE (OUTPATIENT)
Dept: INTERNAL MEDICINE CLINIC | Facility: CLINIC | Age: 85
End: 2020-03-02

## 2020-03-02 NOTE — TELEPHONE ENCOUNTER
On Call telephone call from [facility] from Carlos Dickinson concerning pt. Pt is declining and family has requested Hospice Evaluation for pt. I did give approval for hospice evaluation for pt. I will forward this to Dr Mikel Bowen as an Lattie Blizzard.

## 2020-07-27 NOTE — TELEPHONE ENCOUNTER
[facility] [phone]       requesting physical therapy order for two times a week for two weeks. Pt is declining nursing. Pt is weaker and seem out of it. Normal for race

## 2020-08-05 NOTE — PROGRESS NOTES
Patient comes into the clinic today for a BP CK.  Medications and allergies are gone over with the patient and are up to date.  Karla ZAMAN RN     Subjective    Chief Complaint  This information was obtained from the patient  The patient was seen today for follow up and management of difficult to heal wound. Patient denies any pain or discomfort.  12/6/2017 pt went to see her primary doctor who took o Wound #9 Right, Medial Lower Leg is an acute Partial Thickness Venous Ulcer and has received a status of Not Healed.  Subsequent wound encounter measurements are 0.1cm length x 0.1cm width x 0.1cm depth, with an area of 0.01 sq cm and a volume of 0.001 cubi Assessed patient’s pain status and effectiveness of pain management plan. Cleansed wound and periwound with non-cytotoxic agent. using Wound Cleanser Spray (1)   Applied topical product to starr-wound area avoiding wound base.  using Vaseline (1)   Applied

## 2020-11-19 NOTE — DISCHARGE SUMMARY
More than 30 min spent on dc  Dc summary #[id number]  Hospital Discharge Diagnoses: cellulitis    Lace+ Score: 23  59-90 High Risk  29-58 Medium Risk  0-28   Low Risk. TCM Follow-Up Recommendation:  LACE 29-58:  Moderate Risk of readmission after discharge Home

## 2022-10-01 NOTE — TELEPHONE ENCOUNTER
Discussed with Keith Ornelas. Discussed that mom had a cough with some yellow mucus. Wheezing. She Apsley did not want to make an office visit or go to the emergency room. I have arranged for home health care for evaluation.   I think with this in mind I have ca Med Rec Complete per patient  Allergies Reviewed with patient  No antibiotics within the last 30 days  Patient's Preferred Pharmacy: [facility] [address].       Patient reports that she has intention to start taking a multivitamin tablet and Vitamin D3 in the near future.

## 2022-10-26 NOTE — ED PROVIDER NOTES
Patient Seen in: [facility] In [address] [place] [address]    History   Patient presents with:  Lower Extremity Injury (musculoskeletal)    Stated Complaint: leg swelling/redness    HPI  For the last 2 days patient has noted her right lower extremity swelling/redness  Other systems are as noted in HPI. Constitutional and vital signs reviewed. All other systems reviewed and negative except as noted above.     Physical Exam   ED Triage Vitals [06/26/18 1805]  BP: (!) 162/97  Pulse: 119  Resp: 16  Te Qbrexza Counseling:  I discussed with the patient the risks of Qbrexza including but not limited to headache, mydriasis, blurred vision, dry eyes, nasal dryness, dry mouth, dry throat, dry skin, urinary hesitation, and constipation.  Local skin reactions including erythema, burning, stinging, and itching can also occur.

## 2024-01-01 NOTE — PLAN OF CARE
Problem: Patient Centered Care  Goal: Patient preferences are identified and integrated in the patient's plan of care  Description  Interventions:  - What would you like us to know as we care for you?  I live at home with my daughter.   - Provide timely, Continuous cardiac monitoring, monitor vital signs, obtain 12 lead EKG if indicated  - Evaluate effectiveness of antiarrhythmic and heart rate control medications as ordered  - Initiate emergency measures for life threatening arrhythmias  - Monitor electro Yes

## 2024-08-27 NOTE — DISCHARGE PLANNING
8/26/17 CM Discharge planning / MDO for home health   Referral made to [facility] for home RN - wound care.    Ronald Guerra X [id number] 8/27/2024      Bethanie Segovia  [address]  [place] [address]      Dear Bethanie Segovia,                                                                                                       WESLEY Renteria wrote a referral for you to see a Behavioral Health provider. Our attempt to reach you by phone has been unsuccessful.     Please call the Behavioral Health Central Scheduling Patient Line [phone] at your earliest convenience. Let the  know that a behavioral health referral has been ordered and you are calling to discuss the referral.    We look forward to assisting you with your health care needs.    Sincerely,    Behavioral Health Central Scheduling Department  [phone]

## 2024-11-04 NOTE — TELEPHONE ENCOUNTER
Oswaldo Gracia called back. Relayed Dr Aloysius Klinefelter message to her. She verbalized understanding and verbalized that Corrina's goals have been met. tablet Take 20 mg by mouth 3 times daily Instructed to take morning of surgery with a sip of water Yes Historical Provider, MD   valsartan (DIOVAN) 160 MG tablet Take 160 mg by mouth daily Yes Historical Provider, MD   hydrochlorothiazide (HYDRODIURIL) 25 MG tablet Take 25 mg by mouth daily  Yes Historical Provider, MD   aspirin 81 MG EC tablet Take 1 tablet by mouth 2 times daily Yes Flaco Shoemaker MD   atorvastatin (LIPITOR) 10 MG tablet Take 1 tablet by mouth nightly Yes Flaco Shoemaker MD   levothyroxine (SYNTHROID) 25 MCG tablet Take 25 mcg by mouth daily Yes Historical Provider, MD     Allergies as of 11/12/2018 - Review Complete 11/12/2018   Allergen Reaction Noted    Albuterol Hives 01/22/2014    Iodine Shortness Of Breath 01/22/2014    Vistaril [hydroxyzine hcl] Shortness Of Breath 01/22/2014    Gabapentin Other (See Comments) 01/02/2015    Lyrica [pregabalin]  07/13/2016    Skelaxin [metaxalone] Other (See Comments) 01/22/2014         Objective:   /68 (Site: Right Upper Arm, Position: Sitting, Cuff Size: Medium Adult)   Pulse 77   Resp 16   Ht 5' 4\" (1.626 m)   Wt 179 lb (81.2 kg)   SpO2 94%   BMI 30.73 kg/m²      General appearance: alert, appears stated age and cooperative  Head: Normocephalic, without obvious abnormality, atraumatic  Neck: no adenopathy, no carotid bruit and limited ROM  Lungs: clear to auscultation bilaterally  Heart: regular rate and rhythm  Extremities: no cyanosis, clubbing or edema  Pulses: 2+ and symmetric  Skin: no rashes or lesions     Mental Status: Alert, oriented, thought content appropriate    Excellent historian     Speech: clear with Amy Harpin accent   Language: appropriate     Cranial Nerves:  I: smell    II: visual acuity     II: visual fields Full    II: pupils GREGOR   III,VII: ptosis None   III,IV,VI: extraocular muscles  EOMI without nystagmus    V: mastication Normal   V: facial light touch sensation  Normal   V,VII: corneal reflex  Present   VII: 90

## (undated) NOTE — LETTER
Hospital Discharge Documentation  Pt is scheduled to see you in clinic on 7/2/18    From: [address]  Phone: [phone]    Patient discharged time/date: 6/28/2018  2:52 PM  Patient discharge disposition:  Home or Self Care after discharge from the hospital.lenore fu recommended[LS.1]        Electronically signed by Arina Zhang MD on 6/28/2018  1:49 PM   Attribution Key     LS. 1 - Brenda Story MD on 6/28/2018  1:48 PM

## (undated) NOTE — LETTER
17      Patient: Donna Cat  : 1922 Visit date: 2017    Dear Lindsay Kohli,      I examined your patient in consultation today. She has venous stasis ulcerations of the right leg.     I have referred her to wound care center

## (undated) NOTE — LETTER
Hospital Discharge Documentation  Please phone to schedule a hospital follow up appointment.     From: [address] [facility]  Phone: [phone]    Patient discharged time/date: 1/9/2020  3:35 PM  Patient discharge disposition:  Home or Self Chronic atrial fibrillation  -continued Eliquis and beta-blocker, rate controlled at this time.     Benign hypertension with hypertensive heart disease  -continued home meds     Chronic normocytic anemia  No signs of active bleeding, will continue to monit GGT 13 03/11/2019    MG 2.0 01/08/2020    TROP <0.045 01/06/2020    B12 414 07/09/2018       Recent Labs   Lab 01/06/20  1747 01/07/20  0626 01/08/20  0702   RBC 4.41 4.00 4.50   HGB 13.2 11.6* 13.3   HCT 41.0 36.5 40.2   MCV 93.0 91.3 89.3   MCH 29.9 29. Where to Get Your Medications      These medications were sent to [address] [place] [address], [address] , [address], [address] [facility]    Phone:  [phone]   · lisinopril 5 MG Tabs[NZ.2]         Follow u

## (undated) NOTE — LETTER
Hospital Discharge Documentation  Pt [facility]. Pt scheduled to follow up with you on 9/5/17 for follow up visit.      From: [address]  Phone: [phone]    Patient discharged time/date: 8/26/ Reason for Admission:[RS.1] Afib w/ rvr[RS.2]    Physical Exam:[RS.1]    08/26/17  0849   BP: 124/81   Pulse:[RS.3] 86[RS.2]   Resp: 16   Temp: 97.8 °F (36.6 °C)[RS.3]     GENERAL:  The patient appeared to be in no distress and was comfortable.   SKIN:  War Will need to f/u in afib clinic in 1 week for close monitoring      R malleolus stasis ulcer  Wound care was consulted[RS.2]    Consultations:[RS.1] Cardiology[RS. 2]    Procedures:[RS.1] None[RS. 2]    Complications:[RS.1] None[RS. 2]    Discharge Condition: RS.1 - Ghazala Chappell MD on 8/26/2017  1:10 PM   RS. 2 - Ghazala Chappell MD on 8/26/2017  1:26 PM   RS. 3 - Ghazala Chappell MD on 8/26/2017  1:27 PM   RS. 4 - Ghazala Chappell MD on 8/26/2017  1:30 PM